# Patient Record
Sex: FEMALE | Race: BLACK OR AFRICAN AMERICAN | NOT HISPANIC OR LATINO | Employment: UNEMPLOYED | URBAN - METROPOLITAN AREA
[De-identification: names, ages, dates, MRNs, and addresses within clinical notes are randomized per-mention and may not be internally consistent; named-entity substitution may affect disease eponyms.]

---

## 2020-08-03 ENCOUNTER — HISTORICAL (OUTPATIENT)
Dept: RADIOLOGY | Facility: HOSPITAL | Age: 40
End: 2020-08-03

## 2022-11-23 ENCOUNTER — HOSPITAL ENCOUNTER (EMERGENCY)
Facility: HOSPITAL | Age: 42
Discharge: HOME | End: 2022-11-24
Attending: INTERNAL MEDICINE
Payer: MEDICAID

## 2022-11-23 DIAGNOSIS — F12.180 CANNABIS ABUSE WITH CANNABIS-INDUCED ANXIETY DISORDER: Primary | ICD-10-CM

## 2022-11-23 DIAGNOSIS — F41.9 ANXIETY: ICD-10-CM

## 2022-11-23 PROCEDURE — 93005 ELECTROCARDIOGRAM TRACING: CPT

## 2022-11-23 PROCEDURE — 93010 ELECTROCARDIOGRAM REPORT: CPT | Mod: ,,, | Performed by: INTERNAL MEDICINE

## 2022-11-23 PROCEDURE — 93010 EKG 12-LEAD: ICD-10-PCS | Mod: ,,, | Performed by: INTERNAL MEDICINE

## 2022-11-23 PROCEDURE — 99284 EMERGENCY DEPT VISIT MOD MDM: CPT | Mod: 25

## 2022-11-23 RX ORDER — PROCHLORPERAZINE EDISYLATE 5 MG/ML
10 INJECTION INTRAMUSCULAR; INTRAVENOUS ONCE
Status: COMPLETED | OUTPATIENT
Start: 2022-11-23 | End: 2022-11-24

## 2022-11-23 RX ORDER — MIDAZOLAM HYDROCHLORIDE 1 MG/ML
2 INJECTION INTRAMUSCULAR; INTRAVENOUS
Status: COMPLETED | OUTPATIENT
Start: 2022-11-23 | End: 2022-11-24

## 2022-11-24 VITALS
RESPIRATION RATE: 21 BRPM | HEART RATE: 117 BPM | SYSTOLIC BLOOD PRESSURE: 137 MMHG | BODY MASS INDEX: 29.44 KG/M2 | HEIGHT: 62 IN | OXYGEN SATURATION: 96 % | WEIGHT: 160 LBS | TEMPERATURE: 99 F | DIASTOLIC BLOOD PRESSURE: 82 MMHG

## 2022-11-24 PROCEDURE — 63600175 PHARM REV CODE 636 W HCPCS: Performed by: INTERNAL MEDICINE

## 2022-11-24 PROCEDURE — 96372 THER/PROPH/DIAG INJ SC/IM: CPT | Performed by: INTERNAL MEDICINE

## 2022-11-24 RX ADMIN — MIDAZOLAM 2 MG: 1 INJECTION INTRAMUSCULAR; INTRAVENOUS at 12:11

## 2022-11-24 RX ADMIN — PROCHLORPERAZINE EDISYLATE 10 MG: 5 INJECTION INTRAMUSCULAR; INTRAVENOUS at 12:11

## 2022-11-24 NOTE — ED PROVIDER NOTES
Source of History:  Patient, no limitations    Chief complaint:  Ingestion (REPORTS EATING THC EDIBLES TODAY THEN BECAME ANXIOUS PT SPEAKING IN SHORT BURST; REPORTS HER FIRST TIME TAKING MARIJUANA EDIBLES )      HPI:  Odette Patel is a 42 y.o. female presenting with Ingestion (REPORTS EATING THC EDIBLES TODAY THEN BECAME ANXIOUS PT SPEAKING IN SHORT BURST; REPORTS HER FIRST TIME TAKING MARIJUANA EDIBLES )         Anxiety: Patient complains of anxiety.  She has the following symptoms: paresthesias, psychomotor agitation, racing thoughts, shortness of breath. Onset of symptoms was approximately a few hours ago, unchanged since that time. She denies current suicidal and homicidal ideation.Possible organic causes contributing are:  Edibles for the 1st time today because it was her birthday .       Review of Systems   Constitutional symptoms:  Negative except as documented in HPI.   Skin symptoms:  Negative except as documented in HPI.   HEENT symptoms:  Negative except as documented in HPI.   Respiratory symptoms:  Negative except as documented in HPI.   Cardiovascular symptoms:  Negative except as documented in HPI.   Gastrointestinal symptoms:  Negative except as documented in HPI.    Genitourinary symptoms:  Negative except as documented in HPI.   Musculoskeletal symptoms:  Negative except as documented in HPI.   Neurologic symptoms:  Negative except as documented in HPI.   Psychiatric symptoms:  Negative except as documented in HPI.   Allergy/immunologic symptoms:  Negative except as documented in HPI.             Additional review of systems information: All other systems reviewed and otherwise negative.      Review of patient's allergies indicates:  Not on File    PMH:  As per HPI and below:    Past Medical History:   Diagnosis Date    Anxiety disorder, unspecified     Unspecified open wound of lower back and pelvis without penetration into retroperitoneum, initial encounter         No family history on  "file.    No past surgical history on file.         There is no problem list on file for this patient.       Physical Exam:    /87   Pulse (!) 120   Temp 98.5 °F (36.9 °C)   Resp 18   Ht 5' 2" (1.575 m)   Wt 72.6 kg (160 lb)   SpO2 95%   BMI 29.26 kg/m²     Nursing note and vital signs reviewed.    General:  Alert, anxious  Skin: Normal for Ethnic Origin, No cyanosis  HEENT: Normocephalic and atraumatic, Vision unchanged, Pupils symmetric, No icterus , Nasal mucosa is pink and moist  Cardiovascular:  Regular rate and rhythm, No edema  Chest Wall: No deformity, equal chest rise  Respiratory:  Lungs are clear to auscultation, respirations are non-labored.    Musculoskeletal:  No deformity, Normal perfusion to all extremities  Back: No bony tenderness  : No suprapubic pain, No CVA tenderness  Gastrointestinal:  Soft, Nontender, Non distended, Normal bowel sounds.    Neurological:  Alert and oriented to person, place, time, and situation, normal motor observed, normal speech observed.    Psychiatric:  Cooperative, anxious mood & affect.        Labs that have been ordered have been independently reviewed and interpreted by myself.     Old Chart Reviewed.      Initial Impression/ Differential Dx:  Decompensated psychiatric disease (schizophrenia, bipolar disorder, major depression), excited delirium, medication noncompliance, substance abuse/withdrawal, medication toxicity, acute stress reaction, personality disorder, malingering, metabolic derangement      MDM:      Reviewed Nurses Note.    Reviewed Pertinent old records.    Orders Placed This Encounter    Pulse Oximeter    Cardiac Monitoring - Adult    EKG 12-lead    prochlorperazine injection Soln 10 mg    midazolam (VERSED) 1 mg/mL injection 2 mg                    Labs Reviewed - No data to display       No orders to display        No visits with results within 1 Day(s) from this visit.   Latest known visit with results is:   No results found for any " previous visit.       Imaging Results    None           ECG Results              EKG 12-lead (Preliminary result)  Result time 11/23/22 23:51:49      ED Interpretation by Chris Banks DO (11/23/22 23:51:49, [facility] - Emergency Dept, Emergency Medicine)    Independent ECG Interpretation:    Sinus tachycardia at rate of 111. Normal intervals. Normal QRS. Nonspecific ST or T wave abnormalities. Overall impression: Abnormal                                                ED Course as of 11/24/22 0107   u Nov 24, 2022 0107 Patient improved with treatment in the emergency department and comfortable going home. Discussed reasons to return and importance of followup.  Patient understands that the emergency visit today is primarily to address immediate concerns and to rule out emergent cause of symptoms and that they may require further workup and evaluation as an outpatient. All questions addressed and patient given discharge instructions and followup information.    [MP]      ED Course User Index  [MP] Chris Banks DO                        Diagnostic Impression:    1. Cannabis abuse with cannabis-induced anxiety disorder    2. Anxiety         ED Disposition Condition    Discharge Stable             Follow-up Information       [facility] - Emergency Dept.    Specialty: Emergency Medicine  Why: If symptoms worsen  Contact information:  [address]  [facility] [phone]                            ED Prescriptions    None       Follow-up Information       Follow up With Specialties Details Why Contact Info    [facility] - Emergency Dept Emergency Medicine  If symptoms worsen [address]  [facility] [phone]             Chris Banks DO  11/24/22 0106       Chris Banks DO  11/24/22 0107

## 2023-06-30 ENCOUNTER — HOSPITAL ENCOUNTER (EMERGENCY)
Facility: HOSPITAL | Age: 43
Discharge: HOME | End: 2023-06-30
Attending: STUDENT IN AN ORGANIZED HEALTH CARE EDUCATION/TRAINING PROGRAM
Payer: MEDICAID

## 2023-06-30 VITALS
HEART RATE: 122 BPM | OXYGEN SATURATION: 97 % | DIASTOLIC BLOOD PRESSURE: 92 MMHG | SYSTOLIC BLOOD PRESSURE: 143 MMHG | RESPIRATION RATE: 20 BRPM | TEMPERATURE: 98 F

## 2023-06-30 DIAGNOSIS — S09.90XA CLOSED HEAD INJURY, INITIAL ENCOUNTER: Primary | ICD-10-CM

## 2023-06-30 DIAGNOSIS — S06.0X0A CONCUSSION WITHOUT LOSS OF CONSCIOUSNESS, INITIAL ENCOUNTER: ICD-10-CM

## 2023-06-30 PROCEDURE — 25000003 PHARM REV CODE 250: Performed by: STUDENT IN AN ORGANIZED HEALTH CARE EDUCATION/TRAINING PROGRAM

## 2023-06-30 PROCEDURE — 99284 EMERGENCY DEPT VISIT MOD MDM: CPT | Mod: 25

## 2023-06-30 RX ORDER — HYDROCODONE BITARTRATE AND ACETAMINOPHEN 5; 325 MG/1; MG/1
1 TABLET ORAL
Status: COMPLETED | OUTPATIENT
Start: 2023-06-30 | End: 2023-06-30

## 2023-06-30 RX ADMIN — HYDROCODONE BITARTRATE AND ACETAMINOPHEN 1 TABLET: 5; 325 TABLET ORAL at 03:06

## 2023-06-30 NOTE — ED PROVIDER NOTES
Encounter Date: 6/30/2023       History     Chief Complaint   Patient presents with    Fall     HPI    42-year-old female with a past medical history is emergency department for headache, nausea blurry vision.  Patient states that all this started after she hit her head over 24 hours ago.  States that the symptoms are worsening.  She is concerned that she has a head injury or brain bleed.  Patient very anxious.  States that she feels like her skull is soft.    Review of patient's allergies indicates:  No Known Allergies  Past Medical History:   Diagnosis Date    Anxiety disorder, unspecified     Unspecified open wound of lower back and pelvis without penetration into retroperitoneum, initial encounter      Past Surgical History:   Procedure Laterality Date    BACK SURGERY      CHOLECYSTECTOMY      TUBAL LIGATION       History reviewed. No pertinent family history.  Social History     Tobacco Use    Smoking status: Some Days     Types: Cigarettes    Smokeless tobacco: Never   Substance Use Topics    Alcohol use: Yes     Review of Systems   Constitutional:  Negative for fever.   Eyes:  Positive for visual disturbance.   Respiratory:  Negative for cough and shortness of breath.    Cardiovascular:  Negative for chest pain.   Gastrointestinal:  Positive for nausea. Negative for abdominal pain, constipation, diarrhea and vomiting.   Neurological:  Positive for dizziness and headaches.   All other systems reviewed and are negative.    Physical Exam     Initial Vitals   BP Pulse Resp Temp SpO2   06/30/23 0231 06/30/23 0234 06/30/23 0231 06/30/23 0231 06/30/23 0231   (!) 143/92 (!) 122 16 98.1 °F (36.7 °C) 97 %      MAP       --                Physical Exam    Nursing note and vitals reviewed.  Constitutional: She appears well-developed and well-nourished. No distress.   HENT:   Head: Normocephalic and atraumatic.   Cardiovascular:  Regular rhythm.   Tachycardia present.         Pulmonary/Chest: Breath sounds normal. No  respiratory distress.   Abdominal: Abdomen is soft. There is no abdominal tenderness.   Musculoskeletal:         General: No tenderness. Normal range of motion.     Neurological: She is alert and oriented to person, place, and time. She has normal strength. No cranial nerve deficit. GCS score is 15. GCS eye subscore is 4. GCS verbal subscore is 5. GCS motor subscore is 6.   Skin: Skin is warm. Capillary refill takes less than 2 seconds.   Psychiatric: Her behavior is normal. Thought content normal. Her mood appears anxious.       ED Course   Procedures  Labs Reviewed - No data to display       Imaging Results              CT Head Without Contrast (Preliminary result)  Result time 06/30/23 03:02:47      Preliminary result by Hi Restrepo Jr., MD (06/30/23 03:02:47)                   Narrative:    START OF REPORT:  TECHNIQUE: CT OF THE HEAD WAS PERFORMED WITH INTRAVENOUS CONTRAST WITH AXIAL AS WELL AS CORONAL AND SAGITTAL IMAGES.    COMPARISON: NONE.    DOSAGE INFORMATION: AUTOMATED EXPOSURE CONTROL WAS UTILIZED.    CLINICAL HISTORY: HEADACHE AFTER FALL.    Findings:  Hemorrhage: No acute intracranial hemorrhage is seen.  CSF spaces: The ventricles sulci and basal cisterns are within normal limits.  Brain parenchyma: Unremarkable with preservation of the grey white junction throughout.  Cerebellum: Unremarkable.  Sella and skull base: The sella appears to be within normal limits for age.  Intracranial calcifications: Incidental note is made of bilateral choroid plexus calcification. Incidental note is made of some pineal region calcification. Incidental note is made of some calcification of the falx.  Calvarium: No acute linear or depressed skull fracture is seen.    Maxillofacial Structures:  Orbits: The orbits appear unremarkable.  Temporal bones and mastoids: The temporal bones and mastoids appear unremarkable.  TMJ: The mandibular condyles appear normally placed with respect to the mandibular  fossa.      Impression:  1. No acute intracranial traumatic injury identified. Details as above.                                         Medications   HYDROcodone-acetaminophen 5-325 mg per tablet 1 tablet (1 tablet Oral Given 6/30/23 2794)     Medical Decision Making:   Differential Diagnosis:   Head injury, concussion, ICH, anxiety  ED Management:  Will obtain CT of the head of abundance of precaution.  She does meet CT criteria secondary to dangerous mechanism with supposed severe headache.            Medical Decision Making  Problems Addressed:  Closed head injury, initial encounter: self-limited or minor problem  Concussion without loss of consciousness, initial encounter: acute illness or injury    Amount and/or Complexity of Data Reviewed  Radiology: ordered and independent interpretation performed. Decision-making details documented in ED Course.    Risk  OTC drugs.  Prescription drug management.                  Clinical Impression:   Final diagnoses:  [S09.90XA] Closed head injury, initial encounter (Primary)  [S06.0X0A] Concussion without loss of consciousness, initial encounter        ED Disposition Condition    Discharge Stable          ED Prescriptions    None       Follow-up Information       Follow up With Specialties Details Why Contact Info    [facility] - Emergency Dept Emergency Medicine Go to  If symptoms worsen [address]  [facility] [phone]  [phone]             Jeremiah Donohue MD  06/30/23 8453

## 2023-06-30 NOTE — ED TRIAGE NOTES
Pt states she fell last night and hit her head on the door frame. Pt c/o HA, nausea, and blurred vision.

## 2024-01-28 ENCOUNTER — HOSPITAL ENCOUNTER (EMERGENCY)
Facility: HOSPITAL | Age: 44
Discharge: HOME | End: 2024-01-29
Attending: EMERGENCY MEDICINE
Payer: MEDICAID

## 2024-01-28 VITALS
TEMPERATURE: 99 F | DIASTOLIC BLOOD PRESSURE: 83 MMHG | WEIGHT: 170 LBS | HEART RATE: 107 BPM | RESPIRATION RATE: 20 BRPM | HEIGHT: 63 IN | SYSTOLIC BLOOD PRESSURE: 138 MMHG | OXYGEN SATURATION: 100 % | BODY MASS INDEX: 30.12 KG/M2

## 2024-01-28 DIAGNOSIS — R10.31 RIGHT LOWER QUADRANT ABDOMINAL PAIN: Primary | ICD-10-CM

## 2024-01-28 LAB
ALBUMIN SERPL-MCNC: 3.4 G/DL (ref 3.5–5)
ALBUMIN/GLOB SERPL: 0.9 RATIO (ref 1.1–2)
ALP SERPL-CCNC: 84 UNIT/L (ref 40–150)
ALT SERPL-CCNC: 15 UNIT/L (ref 0–55)
AMPHET UR QL SCN: NEGATIVE
APPEARANCE UR: CLEAR
AST SERPL-CCNC: 17 UNIT/L (ref 5–34)
BARBITURATE SCN PRESENT UR: NEGATIVE
BASOPHILS # BLD AUTO: 0.03 X10(3)/MCL
BASOPHILS NFR BLD AUTO: 0.3 %
BENZODIAZ UR QL SCN: POSITIVE
BILIRUB SERPL-MCNC: 0.2 MG/DL
BILIRUB UR QL STRIP.AUTO: NEGATIVE
BUN SERPL-MCNC: 7.4 MG/DL (ref 7–18.7)
CALCIUM SERPL-MCNC: 8.6 MG/DL (ref 8.4–10.2)
CANNABINOIDS UR QL SCN: NEGATIVE
CHLORIDE SERPL-SCNC: 104 MMOL/L (ref 98–107)
CO2 SERPL-SCNC: 24 MMOL/L (ref 22–29)
COCAINE UR QL SCN: NEGATIVE
COLOR UR AUTO: NORMAL
CREAT SERPL-MCNC: 0.82 MG/DL (ref 0.55–1.02)
EOSINOPHIL # BLD AUTO: 0.39 X10(3)/MCL (ref 0–0.9)
EOSINOPHIL NFR BLD AUTO: 3.6 %
ERYTHROCYTE [DISTWIDTH] IN BLOOD BY AUTOMATED COUNT: 14.3 % (ref 11.5–17)
FENTANYL UR QL SCN: NEGATIVE
GFR SERPLBLD CREATININE-BSD FMLA CKD-EPI: >60 MLS/MIN/1.73/M2
GLOBULIN SER-MCNC: 3.8 GM/DL (ref 2.4–3.5)
GLUCOSE SERPL-MCNC: 98 MG/DL (ref 74–100)
GLUCOSE UR QL STRIP.AUTO: NEGATIVE
HCT VFR BLD AUTO: 38.4 % (ref 37–47)
HGB BLD-MCNC: 12.5 G/DL (ref 12–16)
IMM GRANULOCYTES # BLD AUTO: 0.02 X10(3)/MCL (ref 0–0.04)
IMM GRANULOCYTES NFR BLD AUTO: 0.2 %
KETONES UR QL STRIP.AUTO: NEGATIVE
LACTATE SERPL-SCNC: 1.3 MMOL/L (ref 0.5–2.2)
LEUKOCYTE ESTERASE UR QL STRIP.AUTO: NEGATIVE
LIPASE SERPL-CCNC: 9 U/L
LYMPHOCYTES # BLD AUTO: 3.42 X10(3)/MCL (ref 0.6–4.6)
LYMPHOCYTES NFR BLD AUTO: 31.8 %
MCH RBC QN AUTO: 27.5 PG (ref 27–31)
MCHC RBC AUTO-ENTMCNC: 32.6 G/DL (ref 33–36)
MCV RBC AUTO: 84.6 FL (ref 80–94)
MDMA UR QL SCN: NEGATIVE
MONOCYTES # BLD AUTO: 0.84 X10(3)/MCL (ref 0.1–1.3)
MONOCYTES NFR BLD AUTO: 7.8 %
NEUTROPHILS # BLD AUTO: 6.06 X10(3)/MCL (ref 2.1–9.2)
NEUTROPHILS NFR BLD AUTO: 56.3 %
NITRITE UR QL STRIP.AUTO: NEGATIVE
NRBC BLD AUTO-RTO: 0 %
OPIATES UR QL SCN: POSITIVE
PCP UR QL: NEGATIVE
PH UR STRIP.AUTO: 6 [PH]
PH UR: 6 [PH] (ref 3–11)
PLATELET # BLD AUTO: 260 X10(3)/MCL (ref 130–400)
PMV BLD AUTO: 9.5 FL (ref 7.4–10.4)
POTASSIUM SERPL-SCNC: 3.4 MMOL/L (ref 3.5–5.1)
PROT SERPL-MCNC: 7.2 GM/DL (ref 6.4–8.3)
PROT UR QL STRIP.AUTO: NEGATIVE
RBC # BLD AUTO: 4.54 X10(6)/MCL (ref 4.2–5.4)
RBC UR QL AUTO: NEGATIVE
SODIUM SERPL-SCNC: 134 MMOL/L (ref 136–145)
SP GR UR STRIP.AUTO: 1.01 (ref 1–1.03)
SPECIFIC GRAVITY, URINE AUTO (.000) (OHS): 1.01 (ref 1–1.03)
UROBILINOGEN UR STRIP-ACNC: 1
WBC # SPEC AUTO: 10.76 X10(3)/MCL (ref 4.5–11.5)

## 2024-01-28 PROCEDURE — 63600175 PHARM REV CODE 636 W HCPCS: Mod: JZ,JG | Performed by: EMERGENCY MEDICINE

## 2024-01-28 PROCEDURE — 83690 ASSAY OF LIPASE: CPT | Performed by: EMERGENCY MEDICINE

## 2024-01-28 PROCEDURE — 25000003 PHARM REV CODE 250: Performed by: EMERGENCY MEDICINE

## 2024-01-28 PROCEDURE — 81003 URINALYSIS AUTO W/O SCOPE: CPT | Mod: 59 | Performed by: EMERGENCY MEDICINE

## 2024-01-28 PROCEDURE — 99285 EMERGENCY DEPT VISIT HI MDM: CPT | Mod: 25

## 2024-01-28 PROCEDURE — 85025 COMPLETE CBC W/AUTO DIFF WBC: CPT | Performed by: EMERGENCY MEDICINE

## 2024-01-28 PROCEDURE — 83605 ASSAY OF LACTIC ACID: CPT | Performed by: EMERGENCY MEDICINE

## 2024-01-28 PROCEDURE — 96374 THER/PROPH/DIAG INJ IV PUSH: CPT

## 2024-01-28 PROCEDURE — 80053 COMPREHEN METABOLIC PANEL: CPT | Performed by: EMERGENCY MEDICINE

## 2024-01-28 PROCEDURE — 87491 CHLMYD TRACH DNA AMP PROBE: CPT | Performed by: EMERGENCY MEDICINE

## 2024-01-28 PROCEDURE — 80307 DRUG TEST PRSMV CHEM ANLYZR: CPT | Performed by: EMERGENCY MEDICINE

## 2024-01-28 PROCEDURE — 96375 TX/PRO/DX INJ NEW DRUG ADDON: CPT

## 2024-01-28 RX ORDER — MORPHINE SULFATE 4 MG/ML
4 INJECTION, SOLUTION INTRAMUSCULAR; INTRAVENOUS
Status: COMPLETED | OUTPATIENT
Start: 2024-01-28 | End: 2024-01-28

## 2024-01-28 RX ORDER — SODIUM CHLORIDE 9 MG/ML
1000 INJECTION, SOLUTION INTRAVENOUS
Status: COMPLETED | OUTPATIENT
Start: 2024-01-28 | End: 2024-01-28

## 2024-01-28 RX ORDER — ONDANSETRON HYDROCHLORIDE 2 MG/ML
4 INJECTION, SOLUTION INTRAVENOUS
Status: COMPLETED | OUTPATIENT
Start: 2024-01-28 | End: 2024-01-28

## 2024-01-28 RX ADMIN — MORPHINE SULFATE 4 MG: 4 INJECTION, SOLUTION INTRAMUSCULAR; INTRAVENOUS at 10:01

## 2024-01-28 RX ADMIN — IOHEXOL 100 ML: 350 INJECTION, SOLUTION INTRAVENOUS at 11:01

## 2024-01-28 RX ADMIN — SODIUM CHLORIDE 1000 ML: 9 INJECTION, SOLUTION INTRAVENOUS at 10:01

## 2024-01-28 RX ADMIN — ONDANSETRON 4 MG: 2 INJECTION INTRAMUSCULAR; INTRAVENOUS at 10:01

## 2024-01-28 NOTE — Clinical Note
Ivonne Ashley accompanied their family member to the emergency department on 1/28/2024. They may return to school on 01/30/2024.      If you have any questions or concerns, please don't hesitate to call.      Miley WATTS

## 2024-01-28 NOTE — Clinical Note
"Odette Burr" Jorge was seen and treated in our emergency department on 1/28/2024.  She may return to work on 01/31/2024.       If you have any questions or concerns, please don't hesitate to call.      Sherlyn Mclean MD"

## 2024-01-29 LAB
C TRACH DNA SPEC QL NAA+PROBE: NOT DETECTED
N GONORRHOEA DNA SPEC QL NAA+PROBE: NOT DETECTED
SOURCE (OHS): NORMAL

## 2024-01-29 PROCEDURE — 25500020 PHARM REV CODE 255: Performed by: EMERGENCY MEDICINE

## 2024-01-29 RX ORDER — ONDANSETRON 4 MG/1
4 TABLET, ORALLY DISINTEGRATING ORAL EVERY 8 HOURS PRN
Qty: 12 TABLET | Refills: 0 | Status: SHIPPED | OUTPATIENT
Start: 2024-01-29

## 2024-01-29 RX ORDER — DICYCLOMINE HYDROCHLORIDE 20 MG/1
20 TABLET ORAL 2 TIMES DAILY
Qty: 20 TABLET | Refills: 0 | Status: SHIPPED | OUTPATIENT
Start: 2024-01-29 | End: 2024-02-28

## 2024-01-29 NOTE — ED PROVIDER NOTES
"Encounter Date: 1/28/2024       History     Chief Complaint   Patient presents with    Abdominal Pain     Pt has multiple complaints including, back pain, abdominal pain, dysuria, bladder spasms. Denies taking anything OTC for pain but took a norco 10mg this morning.     Back Pain     Patient is a 42 yo F presenting with R flank and RLQ pain. Symptoms started approx 3 days ago, moderate, now severe, described as "contractions". Never had this before. She's had dysuria. No fevers, no vomiting. No bowel changes. No rashes or leg swelling.       Review of patient's allergies indicates:  No Known Allergies  Past Medical History:   Diagnosis Date    Anxiety disorder, unspecified     Unspecified open wound of lower back and pelvis without penetration into retroperitoneum, initial encounter      Past Surgical History:   Procedure Laterality Date    BACK SURGERY      CHOLECYSTECTOMY      TUBAL LIGATION       History reviewed. No pertinent family history.  Social History     Tobacco Use    Smoking status: Some Days     Types: Cigarettes    Smokeless tobacco: Never   Substance Use Topics    Alcohol use: Yes     Review of Systems   Constitutional:  Negative for fever.   HENT:  Negative for sore throat.    Respiratory:  Negative for shortness of breath.    Cardiovascular:  Negative for chest pain.   Gastrointestinal:  Positive for abdominal pain and nausea. Negative for diarrhea and vomiting.   Genitourinary:  Positive for dysuria.   Musculoskeletal:  Positive for back pain.   Skin:  Negative for rash.   Neurological:  Negative for weakness.   Hematological:  Does not bruise/bleed easily.       Physical Exam     Initial Vitals [01/28/24 2129]   BP Pulse Resp Temp SpO2   138/83 107 18 98.5 °F (36.9 °C) 100 %      MAP       --         Physical Exam    Nursing note and vitals reviewed.  Constitutional: She appears well-developed and well-nourished. No distress.   HENT:   Head: Normocephalic and atraumatic.   Eyes: " Conjunctivae are normal.   Neck: Neck supple.   Cardiovascular:  Normal rate, regular rhythm and normal heart sounds.           No murmur heard.  Pulmonary/Chest: Breath sounds normal. No respiratory distress. She has no wheezes. She has no rhonchi.   Abdominal: Abdomen is soft. Bowel sounds are normal. She exhibits no distension. There is abdominal tenderness. There is guarding. There is no rebound.   Musculoskeletal:         General: No edema. Normal range of motion.      Cervical back: Neck supple.     Neurological: She is alert and oriented to person, place, and time.   Skin: Skin is warm and dry.   Psychiatric: She has a normal mood and affect. Thought content normal.       ED Course   Procedures  Labs Reviewed   DRUG SCREEN, URINE (BEAKER) - Abnormal; Notable for the following components:       Result Value    Benzodiazepine, Urine Positive (*)     Opiates, Urine Positive (*)     All other components within normal limits    Narrative:     Cut off concentrations:    Amphetamines - 1000 ng/ml  Barbiturates - 200 ng/ml  Benzodiazepine - 200 ng/ml  Cannabinoids (THC) - 50 ng/ml  Cocaine - 300 ng/ml  Fentanyl - 1.0 ng/ml  MDMA - 500 ng/ml  Opiates - 300 ng/ml   Phencyclidine (PCP) - 25 ng/ml    Specimen submitted for drug analysis and tested for pH and specific gravity in order to evaluate sample integrity. Suspect tampering if specific gravity is <1.003 and/or pH is not within the range of 4.5 - 8.0  False negatives may result form substances such as bleach added to urine.  False positives may result for the presence of a substance with similar chemical structure to the drug or its metabolite.    This test provides only a PRELIMINARY analytical test result. A more specific alternate chemical method must be used in order to obtain a confirmed analytical result. Gas chromatography/mass spectrometry (GC/MS) is the preferred confirmatory method. Other chemical confirmation methods are available. Clinical consideration  and professional judgement should be applied to any drug of abuse test result, particularly when preliminary positive results are used.    Positive results will be confirmed only at the physicians request. Unconfirmed screening results are to be used only for medical purposes (treatment).        COMPREHENSIVE METABOLIC PANEL - Abnormal; Notable for the following components:    Sodium Level 134 (*)     Potassium Level 3.4 (*)     Albumin Level 3.4 (*)     Globulin 3.8 (*)     Albumin/Globulin Ratio 0.9 (*)     All other components within normal limits   CBC WITH DIFFERENTIAL - Abnormal; Notable for the following components:    MCHC 32.6 (*)     All other components within normal limits   URINALYSIS, REFLEX TO URINE CULTURE - Normal   LIPASE - Normal   LACTIC ACID, PLASMA - Normal   CHLAMYDIA/GONORRHOEAE(GC), PCR   CBC W/ AUTO DIFFERENTIAL    Narrative:     The following orders were created for panel order CBC W/ AUTO DIFFERENTIAL.  Procedure                               Abnormality         Status                     ---------                               -----------         ------                     CBC with Differential[[id number]]        Abnormal            Final result                 Please view results for these tests on the individual orders.          Imaging Results              CT Abdomen Pelvis With IV Contrast NO Oral Contrast (Preliminary result)  Result time 01/29/24 00:44:22      Preliminary result by Rehan Gibbons MD (01/29/24 00:44:22)                   Narrative:    START OF REPORT:  Technique: CT of the abdomen and pelvis was performed with axial images as well as sagittal and coronal reconstruction images with intravenous contrast.    Comparison: Comparison is with CT chest, abdomen and pelvis study dated 2018-10-14 02:40:13.    Clinical History: Right quadrant and periumbilical pain.    Dosage Information: Automated Exposure Control was utilized.    Findings:  Thorax:  Lungs: There is mild  nonspecific dependent change at the lung bases.  Pleura: No effusions or thickening are seen.  Heart: The heart size is within normal limits.  Abdomen:  Abdominal Wall: No abdominal wall pathology is seen.  Liver: The liver appears unremarkable.  Biliary System: No intrahepatic or extrahepatic biliary duct dilatation is seen.  Gallbladder: Surgical clips are seen in the gallbladder fossa consistent with prior cholecystectomy.  Pancreas: The pancreas appears unremarkable.  Spleen: The spleen appears unremarkable.  Adrenals: The adrenal glands appear unremarkable.  Kidneys: The kidneys appear unremarkable with no stones cysts masses or hydronephrosis.  Aorta: The abdominal aorta appears unremarkable.  IVC: Unremarkable.  Bowel:  Esophagus: The visualized esophagus appears unremarkable.  Stomach: The stomach appears unremarkable.  Duodenum: Unremarkable appearing duodenum.  Small Bowel: The small bowel appears unremarkable.  Colon: Nondistended.  Appendix: The appendix appears unremarkable (series 3, images 47-56).  Peritoneum: No free intraperitoneal air is seen. Minimal free fluid is seen in the pelvis.    Pelvis:  Bladder: The bladder appears unremarkable.  Female:  Uterus: The uterus is surgically absent.  Ovaries: No adnexal masses are seen. There is interval resolution of previously noted left hydrosalpinx in the posterior cul-de-sac and the left adnexa.  Inguinal Findings:  Inguinal Hernia: Again noted are small inguinal hernias at the groin bilaterally.    Bony structures:  Dorsal Spine: There is mild spondylosis of the visualized dorsal spine. This is new since the prior examination.  Bony Pelvis: The visualized bony structures of the pelvis appear unremarkable.      Impression:  1. No acute intraabdominal or pelvic solid organ or bowel pathology identified. Details and other findings as discussed above.                                         Medications   ondansetron injection 4 mg (4 mg Intravenous Given  1/28/24 2202)   morphine injection 4 mg (4 mg Intravenous Given 1/28/24 2202)   0.9%  NaCl infusion (1,000 mLs Intravenous New Bag 1/28/24 2228)   iohexoL (OMNIPAQUE 350) injection 100 mL (100 mLs Intravenous Given 1/28/24 2314)     Medical Decision Making  Differentials considered but not limited to GERD, intestinal spasm, gastroenteritis, gastritis, ulcer, pancreatitis, ileus, small bowel obstruction, appendicitis, diverticulitis, colitis, constipation, intestinal gas pain, UTI, interstitial cystitis.     Problems Addressed:  Right lower quadrant abdominal pain: acute illness or injury    Amount and/or Complexity of Data Reviewed  Labs: ordered. Decision-making details documented in ED Course.  Radiology: ordered. Decision-making details documented in ED Course.    Risk  Prescription drug management.  Risk Details: Results were reviewed with patient. Patient resting comfortably in bed, pain controlled. Unclear cause of symptoms at this time but no emergent cause identified. Questions were answered along with a discussion of signs and symptoms to return for. Patient comfortable with plan of discharge. Patient's daughter was present at the time of discussion.                  ED Course as of 01/29/24 0412   Sun Jan 28, 2024   2255 CT and UA pending [GM]   Mon Jan 29, 2024   0053 Results were reviewed with patient. Questions were answered along with a discussion of signs and symptoms to return for. Patient comfortable with plan of discharge.   [GM]      ED Course User Index  [GM] Sherlyn Mclean MD                           Clinical Impression:  Final diagnoses:  [R10.31] Right lower quadrant abdominal pain (Primary)          ED Disposition Condition    Discharge           ED Prescriptions       Medication Sig Dispense Start Date End Date Auth. Provider    dicyclomine (BENTYL) 20 mg tablet Take 1 tablet (20 mg total) by mouth 2 (two) times daily. 20 tablet 1/29/2024 2/28/2024 Sherlyn Mclean MD    ondansetron  (ZOFRAN-ODT) 4 MG TbDL Take 1 tablet (4 mg total) by mouth every 8 (eight) hours as needed (Nausea/Vomiting). 12 tablet 1/29/2024 -- Sherlyn Mclean MD          Follow-up Information       Follow up With Specialties Details Why Contact Info    Please follow up with a primary care physician.  Call in 2 days If symptoms worsen, return to the ED If you do not have a doctor, please call [phone] to schedule your follow up with a local primary care doctor.             Sherlyn Mclean MD  01/29/24 9947

## 2024-03-11 ENCOUNTER — HOSPITAL ENCOUNTER (EMERGENCY)
Facility: HOSPITAL | Age: 44
Discharge: HOME | End: 2024-03-11
Attending: EMERGENCY MEDICINE
Payer: MEDICAID

## 2024-03-11 VITALS
DIASTOLIC BLOOD PRESSURE: 79 MMHG | HEART RATE: 82 BPM | OXYGEN SATURATION: 97 % | TEMPERATURE: 98 F | RESPIRATION RATE: 22 BRPM | SYSTOLIC BLOOD PRESSURE: 119 MMHG

## 2024-03-11 DIAGNOSIS — R00.2 PALPITATIONS: ICD-10-CM

## 2024-03-11 DIAGNOSIS — F41.9 ANXIETY: Primary | ICD-10-CM

## 2024-03-11 LAB
ALBUMIN SERPL-MCNC: 3.9 G/DL (ref 3.5–5)
ALBUMIN/GLOB SERPL: 1.1 RATIO (ref 1.1–2)
ALP SERPL-CCNC: 77 UNIT/L (ref 40–150)
ALT SERPL-CCNC: 17 UNIT/L (ref 0–55)
AMPHET UR QL SCN: NEGATIVE
APPEARANCE UR: CLEAR
AST SERPL-CCNC: 20 UNIT/L (ref 5–34)
BARBITURATE SCN PRESENT UR: NEGATIVE
BASOPHILS # BLD AUTO: 0.05 X10(3)/MCL
BASOPHILS NFR BLD AUTO: 0.4 %
BENZODIAZ UR QL SCN: NEGATIVE
BILIRUB SERPL-MCNC: 0.1 MG/DL
BILIRUB UR QL STRIP.AUTO: NEGATIVE
BUN SERPL-MCNC: 6.8 MG/DL (ref 7–18.7)
CALCIUM SERPL-MCNC: 8.9 MG/DL (ref 8.4–10.2)
CANNABINOIDS UR QL SCN: NEGATIVE
CHLORIDE SERPL-SCNC: 103 MMOL/L (ref 98–107)
CO2 SERPL-SCNC: 25 MMOL/L (ref 22–29)
COCAINE UR QL SCN: NEGATIVE
COLOR UR AUTO: NORMAL
CREAT SERPL-MCNC: 0.78 MG/DL (ref 0.55–1.02)
EOSINOPHIL # BLD AUTO: 0.17 X10(3)/MCL (ref 0–0.9)
EOSINOPHIL NFR BLD AUTO: 1.3 %
ERYTHROCYTE [DISTWIDTH] IN BLOOD BY AUTOMATED COUNT: 14.1 % (ref 11.5–17)
ETHANOL SERPL-MCNC: 37 MG/DL
FENTANYL UR QL SCN: NEGATIVE
GFR SERPLBLD CREATININE-BSD FMLA CKD-EPI: >60 MLS/MIN/1.73/M2
GLOBULIN SER-MCNC: 3.7 GM/DL (ref 2.4–3.5)
GLUCOSE SERPL-MCNC: 67 MG/DL (ref 74–100)
GLUCOSE UR QL STRIP.AUTO: NEGATIVE
HCT VFR BLD AUTO: 39.9 % (ref 37–47)
HGB BLD-MCNC: 13 G/DL (ref 12–16)
IMM GRANULOCYTES # BLD AUTO: 0.03 X10(3)/MCL (ref 0–0.04)
IMM GRANULOCYTES NFR BLD AUTO: 0.2 %
KETONES UR QL STRIP.AUTO: NEGATIVE
LEUKOCYTE ESTERASE UR QL STRIP.AUTO: NEGATIVE
LYMPHOCYTES # BLD AUTO: 4 X10(3)/MCL (ref 0.6–4.6)
LYMPHOCYTES NFR BLD AUTO: 31.5 %
MCH RBC QN AUTO: 28 PG (ref 27–31)
MCHC RBC AUTO-ENTMCNC: 32.6 G/DL (ref 33–36)
MCV RBC AUTO: 86 FL (ref 80–94)
MDMA UR QL SCN: NEGATIVE
MONOCYTES # BLD AUTO: 0.88 X10(3)/MCL (ref 0.1–1.3)
MONOCYTES NFR BLD AUTO: 6.9 %
NEUTROPHILS # BLD AUTO: 7.56 X10(3)/MCL (ref 2.1–9.2)
NEUTROPHILS NFR BLD AUTO: 59.7 %
NITRITE UR QL STRIP.AUTO: NEGATIVE
NRBC BLD AUTO-RTO: 0 %
OHS QRS DURATION: 84 MS
OHS QTC CALCULATION: 434 MS
OPIATES UR QL SCN: POSITIVE
PCP UR QL: NEGATIVE
PH UR STRIP.AUTO: 6.5 [PH]
PH UR: 6.5 [PH] (ref 3–11)
PLATELET # BLD AUTO: 235 X10(3)/MCL (ref 130–400)
PMV BLD AUTO: 9.4 FL (ref 7.4–10.4)
POTASSIUM SERPL-SCNC: 3.5 MMOL/L (ref 3.5–5.1)
PROT SERPL-MCNC: 7.6 GM/DL (ref 6.4–8.3)
PROT UR QL STRIP.AUTO: NEGATIVE
RBC # BLD AUTO: 4.64 X10(6)/MCL (ref 4.2–5.4)
RBC UR QL AUTO: NEGATIVE
SODIUM SERPL-SCNC: 138 MMOL/L (ref 136–145)
SP GR UR STRIP.AUTO: <=1.005 (ref 1–1.03)
SPECIFIC GRAVITY, URINE AUTO (.000) (OHS): <=1.005 (ref 1–1.03)
TROPONIN I SERPL-MCNC: <0.01 NG/ML (ref 0–0.04)
UROBILINOGEN UR STRIP-ACNC: 0.2
WBC # SPEC AUTO: 12.69 X10(3)/MCL (ref 4.5–11.5)

## 2024-03-11 PROCEDURE — 81003 URINALYSIS AUTO W/O SCOPE: CPT | Performed by: EMERGENCY MEDICINE

## 2024-03-11 PROCEDURE — 25000003 PHARM REV CODE 250: Performed by: EMERGENCY MEDICINE

## 2024-03-11 PROCEDURE — 84484 ASSAY OF TROPONIN QUANT: CPT | Performed by: EMERGENCY MEDICINE

## 2024-03-11 PROCEDURE — 80307 DRUG TEST PRSMV CHEM ANLYZR: CPT | Performed by: EMERGENCY MEDICINE

## 2024-03-11 PROCEDURE — 99284 EMERGENCY DEPT VISIT MOD MDM: CPT | Mod: 25

## 2024-03-11 PROCEDURE — 96374 THER/PROPH/DIAG INJ IV PUSH: CPT

## 2024-03-11 PROCEDURE — 80053 COMPREHEN METABOLIC PANEL: CPT | Performed by: EMERGENCY MEDICINE

## 2024-03-11 PROCEDURE — 63600175 PHARM REV CODE 636 W HCPCS: Performed by: EMERGENCY MEDICINE

## 2024-03-11 PROCEDURE — 93005 ELECTROCARDIOGRAM TRACING: CPT

## 2024-03-11 PROCEDURE — 82077 ASSAY SPEC XCP UR&BREATH IA: CPT | Performed by: EMERGENCY MEDICINE

## 2024-03-11 PROCEDURE — 85025 COMPLETE CBC W/AUTO DIFF WBC: CPT | Performed by: EMERGENCY MEDICINE

## 2024-03-11 PROCEDURE — 96361 HYDRATE IV INFUSION ADD-ON: CPT

## 2024-03-11 RX ORDER — ASPIRIN 325 MG
325 TABLET, DELAYED RELEASE (ENTERIC COATED) ORAL
Status: DISCONTINUED | OUTPATIENT
Start: 2024-03-11 | End: 2024-03-11 | Stop reason: HOSPADM

## 2024-03-11 RX ORDER — ALPRAZOLAM 1 MG/1
1 TABLET ORAL 2 TIMES DAILY
Qty: 30 TABLET | Refills: 0 | Status: SHIPPED | OUTPATIENT
Start: 2024-03-11

## 2024-03-11 RX ORDER — SODIUM CHLORIDE 9 MG/ML
1000 INJECTION, SOLUTION INTRAVENOUS
Status: COMPLETED | OUTPATIENT
Start: 2024-03-11 | End: 2024-03-11

## 2024-03-11 RX ORDER — LORAZEPAM 2 MG/ML
2 INJECTION INTRAMUSCULAR
Status: COMPLETED | OUTPATIENT
Start: 2024-03-11 | End: 2024-03-11

## 2024-03-11 RX ADMIN — SODIUM CHLORIDE 1000 ML: 9 INJECTION, SOLUTION INTRAVENOUS at 01:03

## 2024-03-11 RX ADMIN — LORAZEPAM 2 MG: 2 INJECTION INTRAMUSCULAR; INTRAVENOUS at 01:03

## 2024-03-11 NOTE — Clinical Note
Tricia Patel accompanied  their child to the emergency department on 3/11/2024. They may return to school on 03/12/2024.      If you have any questions or concerns, please don't hesitate to call.      Dianna Baker MD

## 2024-03-11 NOTE — Clinical Note
"Odette Burr" Jorge was seen and treated in our emergency department on 3/11/2024.  She may return to work on 03/14/2024.       If you have any questions or concerns, please don't hesitate to call.      Dianna Baker MD"

## 2024-03-11 NOTE — ED PROVIDER NOTES
Encounter Date: 3/11/2024       History     Chief Complaint   Patient presents with    Chest Pain     43-year-old female with a history of anxiety, hypertension, chronic back pain presents to the emergency room with a 1 hour history of anxiety, feeling like her heart is racing, and generalized weakness.  At presentation she was offered a wheelchair and said she felt fine to walk to the room, but on the way to the room, she says her legs got weak and she collapsed to the floor.  She states she did not hurt herself with the fall.  She was picked up by staff members and placed on a stretcher but then threw herself to the side and almost fell off the stretcher. She denies pain at this time.  She states she just feels shaky and weak and is tearful.  She takes Norco 10 mg prescribed 136 tablets last month.  She takes Xanax 1 mg b.i.d. but last prescription was January 28th 60 tablets, and so she has been out the last week.  She says she just did not make to her doctor to give her prescription for this.        Review of patient's allergies indicates:  No Known Allergies  Past Medical History:   Diagnosis Date    Anxiety disorder, unspecified     Hypertension     Unspecified open wound of lower back and pelvis without penetration into retroperitoneum, initial encounter      Past Surgical History:   Procedure Laterality Date    BACK SURGERY      CHOLECYSTECTOMY      TUBAL LIGATION       History reviewed. No pertinent family history.  Social History     Tobacco Use    Smoking status: Some Days     Types: Cigarettes    Smokeless tobacco: Never   Substance Use Topics    Alcohol use: Yes     Review of Systems   Cardiovascular:  Positive for palpitations.   Neurological:  Positive for weakness.   Psychiatric/Behavioral:  The patient is nervous/anxious.    All other systems reviewed and are negative.      Physical Exam     Initial Vitals [03/11/24 0101]   BP Pulse Resp Temp SpO2   119/79 82 (!) 22 97.7 °F (36.5 °C) 97 %      MAP        --         Physical Exam    Nursing note and vitals reviewed.  Constitutional: She appears well-developed and well-nourished. She is not diaphoretic. No distress.   HENT:   Head: Normocephalic and atraumatic.   Mouth/Throat: Oropharynx is clear and moist.   Eyes: Conjunctivae are normal. Pupils are equal, round, and reactive to light.   Neck: Neck supple.   Cardiovascular:  Normal rate, regular rhythm, normal heart sounds and intact distal pulses.           Pulmonary/Chest: Breath sounds normal. No respiratory distress. She has no wheezes. She has no rhonchi. She has no rales.   Abdominal: Abdomen is soft. Bowel sounds are normal. She exhibits no distension. There is no abdominal tenderness. There is no guarding.   Musculoskeletal:         General: No tenderness or edema. Normal range of motion.      Cervical back: Neck supple.     Neurological: She is alert and oriented to person, place, and time.   Skin: Skin is warm and dry. Capillary refill takes less than 2 seconds. No rash noted.   Psychiatric:   Anxious, tearful, tremulous, overwhelmed         ED Course   Procedures  Labs Reviewed   CBC WITH DIFFERENTIAL - Abnormal; Notable for the following components:       Result Value    WBC 12.69 (*)     MCHC 32.6 (*)     All other components within normal limits   COMPREHENSIVE METABOLIC PANEL - Abnormal; Notable for the following components:    Glucose Level 67 (*)     Blood Urea Nitrogen 6.8 (*)     Globulin 3.7 (*)     All other components within normal limits   ALCOHOL,MEDICAL (ETHANOL) - Abnormal; Notable for the following components:    Ethanol Level 37.0 (*)     All other components within normal limits   DRUG SCREEN, URINE (BEAKER) - Abnormal; Notable for the following components:    Opiates, Urine Positive (*)     All other components within normal limits    Narrative:     Cut off concentrations:    Amphetamines - 1000 ng/ml  Barbiturates - 200 ng/ml  Benzodiazepine - 200 ng/ml  Cannabinoids (THC) - 50  ng/ml  Cocaine - 300 ng/ml  Fentanyl - 1.0 ng/ml  MDMA - 500 ng/ml  Opiates - 300 ng/ml   Phencyclidine (PCP) - 25 ng/ml    Specimen submitted for drug analysis and tested for pH and specific gravity in order to evaluate sample integrity. Suspect tampering if specific gravity is <1.003 and/or pH is not within the range of 4.5 - 8.0  False negatives may result form substances such as bleach added to urine.  False positives may result for the presence of a substance with similar chemical structure to the drug or its metabolite.    This test provides only a PRELIMINARY analytical test result. A more specific alternate chemical method must be used in order to obtain a confirmed analytical result. Gas chromatography/mass spectrometry (GC/MS) is the preferred confirmatory method. Other chemical confirmation methods are available. Clinical consideration and professional judgement should be applied to any drug of abuse test result, particularly when preliminary positive results are used.    Positive results will be confirmed only at the physicians request. Unconfirmed screening results are to be used only for medical purposes (treatment).        URINALYSIS, REFLEX TO URINE CULTURE - Normal   TROPONIN I - Normal     EKG Readings: (Independently Interpreted)   Initial Reading: No STEMI. Rhythm: Normal Sinus Rhythm. Heart Rate: 83. ST Segments: Normal ST Segments. T Waves: Normal. Clinical Impression: Normal Sinus Rhythm       Imaging Results    None          Medications   aspirin EC tablet 325 mg (325 mg Oral Not Given 3/11/24 0110)   LORazepam injection 2 mg (2 mg Intravenous Given 3/11/24 0110)   0.9%  NaCl infusion (0 mLs Intravenous Stopped 3/11/24 0229)     Medical Decision Making  See HPI for narrative    Differential diagnosis includes but is not limited to anxiety, substance abuse, dehydration, psychiatric disorder, benzodiazepine withdrawal    Amount and/or Complexity of Data Reviewed  Labs: ordered. Decision-making  details documented in ED Course.  Discussion of management or test interpretation with external provider(s): Patient was seen and evaluated in the Emergency Department with history, physical exam, EKG, lab work, telemetry monitoring.  She was given Ativan 2 mg IV with resolution of her symptoms.  She did drink some alcohol to try to control her anxiety and understands not to drink alcohol.  Glucose was noted to be 67 and she states that she has not been eating much and this was discussed with her.  I will give her a 2 week supply of her Ativan until she can get back to see your doctor about his medication.  She was brought out by wheelchair by Med tech Fermín and was then seen to be able to walk without assistance to her car and her daughter brought her    Risk  Prescription drug management.               ED Course as of 03/11/24 0320   Mon Mar 11, 2024   0201 Glucose(!): 67  Brought her some juice for low blood sugar [SH]   0202 Alcohol, Serum(!): 37.0 [SH]   0222 Patient was given Ativan 2 mg IV and some IV fluids.  She drank some juice for her low blood sugar.  She says she is feeling much better at this time.  I will give her a prescription for 2 weeks' supply of her Xanax until she can see your doctor.  She is stable for discharge home, mentation clear, gait stable. [SH]      ED Course User Index  [SH] Dianna Baker MD                           Clinical Impression:  Final diagnoses:  [F41.9] Anxiety (Primary)  [R00.2] Palpitations          ED Disposition Condition    Discharge Stable          ED Prescriptions       Medication Sig Dispense Start Date End Date Auth. Provider    ALPRAZolam (XANAX) 1 MG tablet Take 1 tablet (1 mg total) by mouth 2 (two) times daily. 30 tablet 3/11/2024 -- Dianna Baker MD          Follow-up Information       Follow up With Specialties Details Why Contact Info    Your PCP  Schedule an appointment as soon as possible for a visit                Dianna Baker  MD  03/11/24 0323

## 2024-03-11 NOTE — Clinical Note
"Odette Burr" Jorge was seen and treated in our emergency department on 3/11/2024.  She may return to school on 03/12/2024.      If you have any questions or concerns, please don't hesitate to call.      Dianna Baker MD"

## 2024-03-11 NOTE — Clinical Note
Ivonne Ramirez accompanied  their child to the emergency department on 3/11/2024. They may return to school on 03/12/2024.      If you have any questions or concerns, please don't hesitate to call.      Dianna Baker MD

## 2024-05-07 DIAGNOSIS — R13.12 OROPHARYNGEAL DYSPHAGIA: Primary | ICD-10-CM

## 2024-05-24 ENCOUNTER — HOSPITAL ENCOUNTER (EMERGENCY)
Facility: HOSPITAL | Age: 44
Discharge: HOME | End: 2024-05-24
Attending: EMERGENCY MEDICINE
Payer: MEDICAID

## 2024-05-24 VITALS
WEIGHT: 170 LBS | DIASTOLIC BLOOD PRESSURE: 88 MMHG | SYSTOLIC BLOOD PRESSURE: 129 MMHG | BODY MASS INDEX: 31.28 KG/M2 | TEMPERATURE: 99 F | OXYGEN SATURATION: 97 % | HEART RATE: 89 BPM | RESPIRATION RATE: 20 BRPM | HEIGHT: 62 IN

## 2024-05-24 DIAGNOSIS — M79.673 HEEL PAIN: Primary | ICD-10-CM

## 2024-05-24 PROCEDURE — 99284 EMERGENCY DEPT VISIT MOD MDM: CPT | Mod: 25

## 2024-05-24 PROCEDURE — 96372 THER/PROPH/DIAG INJ SC/IM: CPT | Performed by: EMERGENCY MEDICINE

## 2024-05-24 PROCEDURE — 63600175 PHARM REV CODE 636 W HCPCS: Performed by: EMERGENCY MEDICINE

## 2024-05-24 RX ORDER — PROCHLORPERAZINE EDISYLATE 5 MG/ML
5 INJECTION INTRAMUSCULAR; INTRAVENOUS
Status: DISCONTINUED | OUTPATIENT
Start: 2024-05-24 | End: 2024-05-24

## 2024-05-24 RX ORDER — HYDROCODONE BITARTRATE AND ACETAMINOPHEN 5; 325 MG/1; MG/1
1 TABLET ORAL EVERY 6 HOURS PRN
Qty: 12 TABLET | Refills: 0 | Status: SHIPPED | OUTPATIENT
Start: 2024-05-24

## 2024-05-24 RX ORDER — DIPHENHYDRAMINE HYDROCHLORIDE 50 MG/ML
25 INJECTION INTRAMUSCULAR; INTRAVENOUS
Status: DISCONTINUED | OUTPATIENT
Start: 2024-05-24 | End: 2024-05-24

## 2024-05-24 RX ORDER — NAPROXEN 500 MG/1
500 TABLET ORAL 2 TIMES DAILY WITH MEALS
Qty: 60 TABLET | Refills: 0 | Status: SHIPPED | OUTPATIENT
Start: 2024-05-24

## 2024-05-24 RX ORDER — FAMOTIDINE 10 MG/ML
20 INJECTION INTRAVENOUS
Status: DISCONTINUED | OUTPATIENT
Start: 2024-05-24 | End: 2024-05-24

## 2024-05-24 RX ORDER — KETOROLAC TROMETHAMINE 30 MG/ML
60 INJECTION, SOLUTION INTRAMUSCULAR; INTRAVENOUS
Status: COMPLETED | OUTPATIENT
Start: 2024-05-24 | End: 2024-05-24

## 2024-05-24 RX ADMIN — KETOROLAC TROMETHAMINE 60 MG: 30 INJECTION, SOLUTION INTRAMUSCULAR at 01:05

## 2024-05-24 NOTE — DISCHARGE INSTRUCTIONS
Do not put any weight on your heel for the next week.  Use the crutches as instructed to get around.

## 2024-05-24 NOTE — ED PROVIDER NOTES
"Encounter Date: 5/24/2024       History     Chief Complaint   Patient presents with    Foot Pain     " Having problems with my heel for a month."     43-year-old female history of hypertension presents with left heel pain intermittently x1 month but worse tonight.  +swelling to heal and increased pain with palpation as well as ambulation.  Patient does not recall any injuries says she does stand on a hard floor throughout the day.  No long distance running or walking.  No pain in her other joints.  No night sweats, fever or chills    The history is provided by the patient.     Review of patient's allergies indicates:  No Known Allergies  Past Medical History:   Diagnosis Date    Anxiety disorder, unspecified     Hypertension     Unspecified open wound of lower back and pelvis without penetration into retroperitoneum, initial encounter      Past Surgical History:   Procedure Laterality Date    BACK SURGERY      CHOLECYSTECTOMY      TUBAL LIGATION       No family history on file.  Social History     Tobacco Use    Smoking status: Some Days     Types: Cigarettes    Smokeless tobacco: Never   Substance Use Topics    Alcohol use: Yes     Review of Systems   Constitutional:  Negative for chills, diaphoresis, fatigue and fever.   HENT:  Negative for congestion, drooling, ear discharge, ear pain, postnasal drip, rhinorrhea, sinus pressure, sinus pain, sore throat and tinnitus.    Eyes:  Negative for discharge.   Respiratory:  Negative for cough, chest tightness, shortness of breath and wheezing.    Cardiovascular:  Negative for chest pain and palpitations.   Gastrointestinal:  Negative for abdominal pain, diarrhea, nausea and vomiting.   Genitourinary:  Negative for frequency, hematuria and urgency.   Musculoskeletal:  Negative for back pain, neck pain and neck stiffness.   Skin:  Negative for rash.   Neurological:  Negative for syncope, weakness, light-headedness, numbness and headaches.   Psychiatric/Behavioral:  Negative " for suicidal ideas.        Physical Exam     Initial Vitals [05/24/24 0117]   BP Pulse Resp Temp SpO2   (!) 124/95 109 20 98.7 °F (37.1 °C) 97 %      MAP       --         Physical Exam    Nursing note and vitals reviewed.  Constitutional: She appears well-developed and well-nourished. She appears distressed.   Pulmonary/Chest: No respiratory distress.   Musculoskeletal:      Left foot: Normal range of motion and normal capillary refill. Swelling and tenderness present.      Comments: +tenderness to palpation of fat pad of left heel.  No increased pain with extension of toes.  No tenderness at insertion of plantar fascia     Skin: Skin is warm and dry. No rash noted. No erythema.         ED Course   Procedures  Labs Reviewed - No data to display       Imaging Results              X-Ray Foot Complete Left (In process)                      Medications   ketorolac injection 60 mg (60 mg Intramuscular Given 5/24/24 0135)     Medical Decision Making  Medical Decision Making  Problem list/ differential diagnosis including but not limited to:  Calcaneus fracture, contusion, heel fat pad syndrome, plantar fasciitis      Patient's chronic illnesses impacting care:  none      Diagnostic test considered but not ordered:      My interpretations:  Foot x-ray: No fracture    Radiology reports      Discussion of case with external qualified healthcare professionals:  Not applicable      Review of external notes( inpt, ems, NH, clinic):      Decision rules/scores:    Medications reviewed:  Medications ordered in the ER:  Toradol  Discharge prescriptions:  Naproxen, Norco    Social variables possible impacting patient's healthcare:    Code status/discussion    Shared decision making:    Consideration for admission versus discharge: stable for discharge     Amount and/or Complexity of Data Reviewed  Radiology: ordered.    Risk  Prescription drug management.                                      Clinical Impression:  Final  diagnoses:  [M79.673] Heel pain (Primary)          ED Disposition Condition    Discharge Stable          ED Prescriptions       Medication Sig Dispense Start Date End Date Auth. Provider    HYDROcodone-acetaminophen (NORCO) 5-325 mg per tablet Take 1 tablet by mouth every 6 (six) hours as needed. 12 tablet 5/24/2024 -- Rik Hudson MD    naproxen (NAPROSYN) 500 MG tablet Take 1 tablet (500 mg total) by mouth 2 (two) times daily with meals. 60 tablet 5/24/2024 -- Rik Hudson MD          Follow-up Information    None          Rik Hudson MD  05/24/24 9935

## 2024-10-09 ENCOUNTER — HOSPITAL ENCOUNTER (EMERGENCY)
Facility: HOSPITAL | Age: 44
Discharge: HOME | End: 2024-10-09
Attending: STUDENT IN AN ORGANIZED HEALTH CARE EDUCATION/TRAINING PROGRAM
Payer: MEDICAID

## 2024-10-09 VITALS
WEIGHT: 168 LBS | HEIGHT: 62 IN | TEMPERATURE: 98 F | OXYGEN SATURATION: 98 % | HEART RATE: 94 BPM | BODY MASS INDEX: 30.91 KG/M2 | SYSTOLIC BLOOD PRESSURE: 147 MMHG | DIASTOLIC BLOOD PRESSURE: 91 MMHG | RESPIRATION RATE: 20 BRPM

## 2024-10-09 DIAGNOSIS — N64.4 BREAST PAIN, RIGHT: Primary | ICD-10-CM

## 2024-10-09 PROCEDURE — 96372 THER/PROPH/DIAG INJ SC/IM: CPT | Performed by: NURSE PRACTITIONER

## 2024-10-09 PROCEDURE — 25000003 PHARM REV CODE 250: Performed by: NURSE PRACTITIONER

## 2024-10-09 PROCEDURE — 99284 EMERGENCY DEPT VISIT MOD MDM: CPT | Mod: 25

## 2024-10-09 PROCEDURE — 63600175 PHARM REV CODE 636 W HCPCS: Performed by: NURSE PRACTITIONER

## 2024-10-09 RX ORDER — MORPHINE SULFATE 4 MG/ML
4 INJECTION, SOLUTION INTRAMUSCULAR; INTRAVENOUS
Status: COMPLETED | OUTPATIENT
Start: 2024-10-09 | End: 2024-10-09

## 2024-10-09 RX ORDER — ONDANSETRON 4 MG/1
4 TABLET, ORALLY DISINTEGRATING ORAL
Status: COMPLETED | OUTPATIENT
Start: 2024-10-09 | End: 2024-10-09

## 2024-10-09 RX ORDER — KETOROLAC TROMETHAMINE 10 MG/1
10 TABLET, FILM COATED ORAL EVERY 8 HOURS PRN
Qty: 10 TABLET | Refills: 0 | Status: SHIPPED | OUTPATIENT
Start: 2024-10-09

## 2024-10-09 RX ORDER — KETOROLAC TROMETHAMINE 30 MG/ML
60 INJECTION, SOLUTION INTRAMUSCULAR; INTRAVENOUS
Status: COMPLETED | OUTPATIENT
Start: 2024-10-09 | End: 2024-10-09

## 2024-10-09 RX ORDER — CEPHALEXIN 500 MG/1
500 CAPSULE ORAL 4 TIMES DAILY
Qty: 28 CAPSULE | Refills: 0 | Status: SHIPPED | OUTPATIENT
Start: 2024-10-09 | End: 2024-10-16

## 2024-10-09 RX ADMIN — KETOROLAC TROMETHAMINE 60 MG: 30 INJECTION, SOLUTION INTRAMUSCULAR at 08:10

## 2024-10-09 RX ADMIN — ONDANSETRON 4 MG: 4 TABLET, ORALLY DISINTEGRATING ORAL at 09:10

## 2024-10-09 RX ADMIN — MORPHINE SULFATE 4 MG: 4 INJECTION INTRAVENOUS at 09:10

## 2024-10-10 NOTE — ED TRIAGE NOTES
Pt c/o pain to left breast. Symptoms x 2 days. Pt states pain in to upper left breast and underneath breast. Denies swelling and abnormal discharge.

## 2024-10-10 NOTE — ED PROVIDER NOTES
Encounter Date: 10/9/2024       History     Chief Complaint   Patient presents with    Breast Pain     Left breast     Patient states right breast pain x2 days.  Denies any redness, swelling, drainage, or nipple changes.  Patient denies any fever.  Patient states that pain is constant and worsens with palpation.  Patient denies any history of breast cancer.  History of hypertension, anxiety, hysterectomy.    The history is provided by the patient.     Review of patient's allergies indicates:  No Known Allergies  Past Medical History:   Diagnosis Date    Anxiety disorder, unspecified     Hypertension     Unspecified open wound of lower back and pelvis without penetration into retroperitoneum, initial encounter      Past Surgical History:   Procedure Laterality Date    BACK SURGERY      CHOLECYSTECTOMY      HYSTERECTOMY      TUBAL LIGATION       No family history on file.  Social History     Tobacco Use    Smoking status: Former     Types: Cigarettes    Smokeless tobacco: Never   Substance Use Topics    Alcohol use: Yes     Review of Systems   Constitutional: Negative.  Negative for chills and fever.   HENT: Negative.     Eyes: Negative.    Respiratory: Negative.     Cardiovascular: Negative.    Gastrointestinal: Negative.    Endocrine: Negative.    Genitourinary:         Breast pain.   Musculoskeletal: Negative.    Skin: Negative.  Negative for rash.   Allergic/Immunologic: Negative.    Neurological: Negative.    Hematological: Negative.    Psychiatric/Behavioral: Negative.     All other systems reviewed and are negative.      Physical Exam     Initial Vitals [10/09/24 2023]   BP Pulse Resp Temp SpO2   (!) 167/109 94 16 98 °F (36.7 °C) 98 %      MAP       --         Physical Exam    Nursing note and vitals reviewed.  Constitutional: She appears well-developed and well-nourished. No distress.   HENT:   Head: Normocephalic and atraumatic. Mouth/Throat: Oropharynx is clear and moist.   Eyes: Conjunctivae and EOM are  normal. Pupils are equal, round, and reactive to light.   Neck: Neck supple.   Normal range of motion.  Cardiovascular:  Normal rate, regular rhythm, normal heart sounds and intact distal pulses.           Pulmonary/Chest: Effort normal and breath sounds normal. No respiratory distress. She has no wheezes.   Right breast exhibits tenderness (There is tenderness to palpation to the upper outer breast.  There is no erythema, lumps, fluctuance, or abscess noted.). Right breast exhibits no inverted nipple, no mass, no nipple discharge and no skin change. Left breast exhibits no inverted nipple, no mass, no nipple discharge, no skin change and no tenderness. No breast swelling. Breasts are symmetrical.   Abdominal: Abdomen is soft. She exhibits no distension. There is no abdominal tenderness.   Genitourinary: No breast swelling.   Musculoskeletal:         General: No tenderness or edema. Normal range of motion.      Cervical back: Normal range of motion and neck supple.     Neurological: She is alert and oriented to person, place, and time. She has normal strength. Gait normal. GCS score is 15. GCS eye subscore is 4. GCS verbal subscore is 5. GCS motor subscore is 6.   Skin: Skin is warm and dry. No rash noted.   Psychiatric: She has a normal mood and affect. Thought content normal.         ED Course   Procedures  Labs Reviewed - No data to display       Imaging Results    None          Medications   ondansetron disintegrating tablet 4 mg (has no administration in time range)   morphine injection 4 mg (has no administration in time range)   ketorolac injection 60 mg (60 mg Intramuscular Given 10/9/24 2045)     Medical Decision Making  Patient states right breast pain x2 days.  Denies any redness, swelling, drainage, or nipple changes.  Patient denies any fever.  Patient states that pain is constant and worsens with palpation.  Patient denies any history of breast cancer.  History of hypertension, anxiety,  hysterectomy.    The history is provided by the patient.       Amount and/or Complexity of Data Reviewed  Discussion of management or test interpretation with external provider(s): Differential diagnosis (including but not limited to):   Judging by the patient's chief complaint and pertinent history, the patient has the following possible differential diagnoses, including but not limited to the following.  Some of these are deemed to be lower likelihood and some more likely based on my physical exam and history combined with possible lab work and/or imaging studies.   Please see the pertinent studies, and refer to the HPI.  Some of these diagnoses will take further evaluation to fully rule out, perhaps as an outpatient and the patient was encouraged to follow up when discharged for more comprehensive evaluation.  Breast pain, mastitis, abscess, cellulitis, breast cancer  Discussed with patient that we will treat her right breast pain with anti-inflammatories and will send a referral to the breast clinic at [facility] for a follow-up so that she can obtain a mammogram and evaluation.  On exam right breast is without any skin changes, nipple changes, no lumps are felt, no swelling is noted, and breasts are symmetrical.  There is no abscess or fluctuance noted on exam as well.  There is tenderness to palpation to the right upper outer quadrant of right breast.  Patient was instructed to follow up with her primary care provider.  ED return precautions given.    Risk  Prescription drug management.                                      Clinical Impression:  Final diagnoses:  [N64.4] Breast pain, right (Primary)          ED Disposition Condition    Discharge Stable          ED Prescriptions       Medication Sig Dispense Start Date End Date Auth. Provider    cephALEXin (KEFLEX) 500 MG capsule Take 1 capsule (500 mg total) by mouth 4 (four) times daily. for 7 days 28 capsule 10/9/2024 10/16/2024 Elizabeth Gomes, JEOVANNYP    ketorolac  (TORADOL) 10 mg tablet Take 1 tablet (10 mg total) by mouth every 8 (eight) hours as needed for Pain. 10 tablet 10/9/2024 -- Elizabeth Gomes FNP          Follow-up Information       Follow up With Specialties Details Why Contact Info    Primary Care Provider  In 3 days      [facility] will call you with a follow-up appointment.                 Elizabeth Gomes FNP  10/09/24 1889

## 2024-12-06 ENCOUNTER — HOSPITAL ENCOUNTER (EMERGENCY)
Facility: HOSPITAL | Age: 44
Discharge: HOME | End: 2024-12-06
Attending: EMERGENCY MEDICINE
Payer: MEDICAID

## 2024-12-06 VITALS
HEIGHT: 62 IN | DIASTOLIC BLOOD PRESSURE: 88 MMHG | SYSTOLIC BLOOD PRESSURE: 144 MMHG | TEMPERATURE: 98 F | BODY MASS INDEX: 31.28 KG/M2 | RESPIRATION RATE: 18 BRPM | WEIGHT: 170 LBS | OXYGEN SATURATION: 99 % | HEART RATE: 97 BPM

## 2024-12-06 DIAGNOSIS — J01.80 OTHER SUBACUTE SINUSITIS: ICD-10-CM

## 2024-12-06 DIAGNOSIS — R51.9 ACUTE NONINTRACTABLE HEADACHE, UNSPECIFIED HEADACHE TYPE: Primary | ICD-10-CM

## 2024-12-06 PROCEDURE — 96372 THER/PROPH/DIAG INJ SC/IM: CPT

## 2024-12-06 PROCEDURE — 63600175 PHARM REV CODE 636 W HCPCS

## 2024-12-06 PROCEDURE — 25000003 PHARM REV CODE 250

## 2024-12-06 PROCEDURE — 99284 EMERGENCY DEPT VISIT MOD MDM: CPT | Mod: 25

## 2024-12-06 RX ORDER — BUTALBITAL, ACETAMINOPHEN AND CAFFEINE 50; 325; 40 MG/1; MG/1; MG/1
1 TABLET ORAL
Status: COMPLETED | OUTPATIENT
Start: 2024-12-06 | End: 2024-12-06

## 2024-12-06 RX ORDER — DIPHENHYDRAMINE HCL 25 MG
25 CAPSULE ORAL
Status: COMPLETED | OUTPATIENT
Start: 2024-12-06 | End: 2024-12-06

## 2024-12-06 RX ORDER — DEXAMETHASONE SODIUM PHOSPHATE 4 MG/ML
8 INJECTION, SOLUTION INTRA-ARTICULAR; INTRALESIONAL; INTRAMUSCULAR; INTRAVENOUS; SOFT TISSUE
Status: COMPLETED | OUTPATIENT
Start: 2024-12-06 | End: 2024-12-06

## 2024-12-06 RX ORDER — KETOROLAC TROMETHAMINE 30 MG/ML
30 INJECTION, SOLUTION INTRAMUSCULAR; INTRAVENOUS
Status: COMPLETED | OUTPATIENT
Start: 2024-12-06 | End: 2024-12-06

## 2024-12-06 RX ORDER — AMOXICILLIN AND CLAVULANATE POTASSIUM 875; 125 MG/1; MG/1
1 TABLET, FILM COATED ORAL EVERY 12 HOURS
Qty: 14 TABLET | Refills: 0 | Status: SHIPPED | OUTPATIENT
Start: 2024-12-06 | End: 2024-12-13

## 2024-12-06 RX ORDER — KETOROLAC TROMETHAMINE 10 MG/1
10 TABLET, FILM COATED ORAL EVERY 6 HOURS PRN
Qty: 20 TABLET | Refills: 0 | Status: SHIPPED | OUTPATIENT
Start: 2024-12-06 | End: 2024-12-11

## 2024-12-06 RX ORDER — PROCHLORPERAZINE MALEATE 10 MG
10 TABLET ORAL
Status: COMPLETED | OUTPATIENT
Start: 2024-12-06 | End: 2024-12-06

## 2024-12-06 RX ADMIN — PROCHLORPERAZINE MALEATE 10 MG: 10 TABLET ORAL at 02:12

## 2024-12-06 RX ADMIN — KETOROLAC TROMETHAMINE 30 MG: 30 INJECTION, SOLUTION INTRAMUSCULAR at 02:12

## 2024-12-06 RX ADMIN — DEXAMETHASONE SODIUM PHOSPHATE 8 MG: 4 INJECTION, SOLUTION INTRA-ARTICULAR; INTRALESIONAL; INTRAMUSCULAR; INTRAVENOUS; SOFT TISSUE at 04:12

## 2024-12-06 RX ADMIN — BUTALBITAL, ACETAMINOPHEN, AND CAFFEINE 1 TABLET: 325; 50; 40 TABLET ORAL at 04:12

## 2024-12-06 RX ADMIN — DIPHENHYDRAMINE HYDROCHLORIDE 25 MG: 25 CAPSULE ORAL at 02:12

## 2024-12-06 NOTE — ED PROVIDER NOTES
"Encounter Date: 12/6/2024       History     Chief Complaint   Patient presents with    Headache     Headache X 1 month,  "knots in neck     44 y.o.  female with a history of Hypertension and Anxiety presents to Emergency Department with a chief complaint of headache. Symptoms began 1 month ago and have been recurrent since onset.. Associated symptoms include nausea, blurred vision, and "knots on her head". Symptoms are aggravated with exertion and there are no alleviating factors. Patient has not seen her PCP regarding symptoms. The patient denies CP, SOB, dizziness, injury/trauma, vomiting, syncope, or confusion. She reports taking NSAIDs and Tylenol without relief of symptoms. No other reported symptoms at this time      The history is provided by the patient. No  was used.   Headache   This is a recurrent problem. The current episode started 1 to 4 weeks ago. The problem occurs daily. The problem has been unchanged. Associated symptoms include blurred vision and nausea. Pertinent negatives include no abdominal pain, coughing, dizziness, fever, numbness, photophobia or vomiting. She has tried NSAIDs and acetaminophen for the symptoms. The treatment provided no relief. Her past medical history is significant for hypertension.     Review of patient's allergies indicates:  No Known Allergies  Past Medical History:   Diagnosis Date    Anxiety disorder, unspecified     Hypertension     Unspecified open wound of lower back and pelvis without penetration into retroperitoneum, initial encounter      Past Surgical History:   Procedure Laterality Date    BACK SURGERY      CHOLECYSTECTOMY      HYSTERECTOMY      TUBAL LIGATION       No family history on file.  Social History     Tobacco Use    Smoking status: Former     Types: Vaping with nicotine    Smokeless tobacco: Never   Substance Use Topics    Alcohol use: Yes     Comment: drinks socially    Drug use: Never     Review of Systems "   Constitutional:  Negative for diaphoresis, fatigue and fever.   Eyes:  Positive for blurred vision. Negative for photophobia.   Respiratory:  Negative for cough, shortness of breath, wheezing and stridor.    Cardiovascular:  Negative for chest pain and leg swelling.   Gastrointestinal:  Positive for nausea. Negative for abdominal pain and vomiting.   Neurological:  Positive for headaches. Negative for dizziness, syncope, facial asymmetry and numbness.   All other systems reviewed and are negative.      Physical Exam     Initial Vitals [12/06/24 1423]   BP Pulse Resp Temp SpO2   (!) 164/138 105 20 97.9 °F (36.6 °C) 98 %      MAP       --         Physical Exam    Nursing note and vitals reviewed.  Constitutional: She appears well-developed and well-nourished. She is not diaphoretic. She is cooperative.  Non-toxic appearance. No distress.   HENT:   Head: Normocephalic and atraumatic. Head is without abrasion, without contusion, without right periorbital erythema and without left periorbital erythema. Hair is normal.       Right Ear: External ear normal.   Left Ear: External ear normal.   Nose: Nose normal.   Tenderness noted to outlined area.    Eyes: Conjunctivae and EOM are normal. Pupils are equal, round, and reactive to light. Right eye exhibits no discharge. Left eye exhibits no discharge.   Neck: Neck supple.       Small area of swelling and tenderness noted. No redness, discoloration, or wounds noted.    Normal range of motion.  Cardiovascular:  Normal rate, regular rhythm, S1 normal, S2 normal, normal heart sounds, intact distal pulses and normal pulses.           Pulmonary/Chest: Effort normal and breath sounds normal. No tachypnea and no bradypnea. No respiratory distress. She has no decreased breath sounds. She has no wheezes. She has no rhonchi. She has no rales.   Musculoskeletal:         General: Normal range of motion.      Cervical back: Normal range of motion and neck supple. No edema, erythema or  rigidity. No spinous process tenderness. Normal range of motion.     Neurological: She is alert and oriented to person, place, and time. She has normal strength. No sensory deficit. She displays a negative Romberg sign. GCS score is 15. GCS eye subscore is 4. GCS verbal subscore is 5. GCS motor subscore is 6.   Mental status   Alert and attentive   Speech clear and fluent with normal comprehension   Able to provide clear account of historical and recent events   Oriented to person place and time   Able to follow 3 commands sequence    Cranial nerves   2. Pupils equal round reactive to light bilaterally, right eye:  No visual defects, 4 quadrant my right finger confirmation.  Left eye:  No visual defects, 4 quadrant by finger confirmation   3. Bilateral eye adduction and elevation without diplopia    4. Bilateral eye adduction and depression without diplopia   6. Bilateral eye abduction with diplopia  5. Masseter muscle normal bulk and jaw opens symmetrically   7. Face symmetric during speech.  Wrinkle forehead and smile symmetrically intact, bilaterally   8.  Response to verbal stimulus   9. Normal speech without breathlessness or hoarseness   10. Normal speech without breathlessness or hoarseness  12. Tongue protrudes midline    Motor exam   Upper extremities: No pronator drift   Lower extremities:  Bilateral leg lift off bed to resistance, symmetric 5/5   Coordination:  No nystagmus, no saccadic pursuit on eye range of motion    Gait:  Normal gait  Sensation: Deferred     Skin: Skin is warm and dry. Capillary refill takes less than 2 seconds.   Psychiatric: She has a normal mood and affect. Thought content normal.         ED Course   Procedures  Labs Reviewed - No data to display       Imaging Results              CT Head Without Contrast (Final result)  Result time 12/06/24 15:45:11      Final result by Drew Irene MD (12/06/24 15:45:11)                   Impression:      Ethmoid and sphenoid  "sinusitis otherwise unremarkable      Electronically signed by: Tino Irene  Date:    12/06/2024  Time:    15:45               Narrative:    EXAMINATION:  CT HEAD WITHOUT CONTRAST    CLINICAL HISTORY:  Headache, chronic, new features or increased frequency;    TECHNIQUE:  Multiple axial images were obtained from the base of the brain to the vertex without contrast administration.  Sagittal and coronal reconstructions were performed. .Automatic exposure control  (AEC) is utilized to reduce patient radiation exposure.    COMPARISON:  06/30/2023    FINDINGS:  There is no intracranial mass or lesion seen.  No hemorrhage is seen.  No infarct is seen.  The ventricles and basilar cisterns appear normal.  Brain parenchyma appears grossly unremarkable.    Posterior fossa appears normal.  The calvarium is intact.  There is mucosal thickening in the ethmoid sinus as well as the sphenoid sinus                                       Medications   ketorolac injection 30 mg (30 mg Intramuscular Given 12/6/24 1457)   prochlorperazine tablet 10 mg (10 mg Oral Given 12/6/24 1457)   diphenhydrAMINE capsule 25 mg (25 mg Oral Given 12/6/24 1457)   dexAMETHasone injection 8 mg (8 mg Intramuscular Given 12/6/24 1612)   butalbital-acetaminophen-caffeine -40 mg per tablet 1 tablet (1 tablet Oral Given 12/6/24 1611)     Medical Decision Making  Patient awake, alert, has non-labored breathing, and follows commands appropriately. Arrived to ED due to headache that has been recurrent for 1 month. Also c/o burred vision, nausea, and "knots" to neck. Afebrile. Denies injury/trauma. NAD Noted.     Judging by the patient's chief complaint and pertinent history, the patient has the following possible differential diagnoses, including but not limited to the following: Headache, Migraine, Hypertension, Pain    Some of these are deemed to be lower likelihood and some more likely based on my physical exam and history combined with possible lab " "work and/or imaging studies. Please see the pertinent studies, and refer to the HPI. Some of these diagnoses will take further evaluation to fully rule out, perhaps as an outpatient and the patient was encouraged to follow up when discharged for more comprehensive evaluation.       Amount and/or Complexity of Data Reviewed  Radiology: ordered. Decision-making details documented in ED Course.     Details: Informed patient of results.   Discussion of management or test interpretation with external provider(s): Since patient has been having headache for 1 month and imaging revealed sinusitis, will treat with Augmentin. Patient's "knot" to the back of her head could represent cyst, to f/u with PCP regarding skin lesion. No signs of infection noted. I & D not indicated. Discussed plan of care and interventions with patient. Agreed to and aware of plan of care. Comfortable being discharged home. Patient discharged home. Patient denies new or additional complaints; no further tests indicated at this time. Verbalized understanding of instructions. No emergent or apparent distress noted prior to discharge.. Strict ER return precautions given.       Risk  OTC drugs.  Prescription drug management.               ED Course as of 12/06/24 1710   Fri Dec 06, 2024   1549 CT Head Without Contrast  There is no intracranial mass or lesion seen.  No hemorrhage is seen.  No infarct is seen.  The ventricles and basilar cisterns appear normal.  Brain parenchyma appears grossly unremarkable. Posterior fossa appears normal.  The calvarium is intact.  There is mucosal thickening in the ethmoid sinus as well as the sphenoid sinus   [JA]   1650 Patient reports headache has resolved after med admin. Discussed results with patient. Since headache has been persistent for several weeks, will refer to neurology for outpatient evaluation. At disposition, patient has no additional complaints, symptoms improved, neurologically intact, has no stroke " like symptoms, no neuro deficits noted, and non-toxic in appearance. Stable for discharge home.  [JA]      ED Course User Index  [JA] Neetu Victor NP                           Clinical Impression:  Final diagnoses:  [R51.9] Acute nonintractable headache, unspecified headache type (Primary)  [J01.80] Other subacute sinusitis          ED Disposition Condition    Discharge Stable          ED Prescriptions       Medication Sig Dispense Start Date End Date Auth. Provider    ketorolac (TORADOL) 10 mg tablet Take 1 tablet (10 mg total) by mouth every 6 (six) hours as needed for Pain. 20 tablet 12/6/2024 12/11/2024 Neetu Victor NP    amoxicillin-clavulanate 875-125mg (AUGMENTIN) 875-125 mg per tablet Take 1 tablet by mouth every 12 (twelve) hours. for 7 days 14 tablet 12/6/2024 12/13/2024 Neetu Victor NP          Follow-up Information       Follow up With Specialties Details Why Contact Info Additional Information    PCP  Call on 12/9/2024 to schedule an appointment.      [facility] - Emergency Dept Emergency Medicine Go to  If symptoms worsen, As needed [address] [facility] [id number] [phone]     [facility] Neurology Call on 12/9/2024 to schedule an appointment. [address] [facility] [id number] [phone] Entrance 1             Neetu Victor NP  12/06/24 [id number]

## 2024-12-06 NOTE — DISCHARGE INSTRUCTIONS
Thanks for letting us take care of you today!  It is our goal to give you courteous care and to keep you comfortable and informed, if you have any questions before you leave I will be happy to try and answer them.    Here is some advice after your visit:      Your visit in the emergency department is NOT definitive care - please follow-up with your primary care doctor and/or specialist within 1 week.  Please return if you have any worsening in your condition or if you have any other concerns.    If you had radiology exams like an XRAY or CT in the emergency Department the interpreation on them may be preliminary - there may be less time sensitive findings on the reports please obtain these reports within 24 hours from the hospital or by using your out on your mobile phone to access records.  Bring these to your primary care doctor and/or specialist for further review of incidental findings.    You have been prescribed Toradol for pain. This is an Non-Steroidal Anti-Inflammatory (NSAID) Medication. Please do not take any additional NSAIDs while you are taking this medication including (Advil, Aleve, Motrin, Ibuprofen, Mobic\meloxicam, Naprosyn, Toradol, etc.). Please stop taking this medication if you experience: weakness, itching, yellow skin or eyes, joint pains, vomiting blood, blood or black stools, unusual weight gain, or swelling in your arms, legs, hands, or feet.     While in the Emergency Department you received medication that may cause drowsiness, dizziness, impaired judgment, and reduced physical capabilities. You should not drive, operate heavy machinery, swim, or make life  changing decisions within 24 hours of receiving this medication.

## 2024-12-08 ENCOUNTER — HOSPITAL ENCOUNTER (EMERGENCY)
Facility: HOSPITAL | Age: 44
Discharge: HOME | End: 2024-12-08
Attending: EMERGENCY MEDICINE
Payer: MEDICAID

## 2024-12-08 VITALS
DIASTOLIC BLOOD PRESSURE: 101 MMHG | RESPIRATION RATE: 20 BRPM | HEART RATE: 101 BPM | SYSTOLIC BLOOD PRESSURE: 140 MMHG | TEMPERATURE: 99 F | HEIGHT: 62 IN | OXYGEN SATURATION: 97 % | BODY MASS INDEX: 31.28 KG/M2 | WEIGHT: 170 LBS

## 2024-12-08 DIAGNOSIS — S16.1XXA CERVICAL STRAIN, ACUTE, INITIAL ENCOUNTER: Primary | ICD-10-CM

## 2024-12-08 DIAGNOSIS — S00.03XA SCALP HEMATOMA, INITIAL ENCOUNTER: ICD-10-CM
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
## 2024-12-08 PROCEDURE — 25000003 PHARM REV CODE 250: Performed by: EMERGENCY MEDICINE

## 2024-12-08 PROCEDURE — 99284 EMERGENCY DEPT VISIT MOD MDM: CPT | Mod: 25

## 2024-12-08 RX ORDER — IBUPROFEN 800 MG/1
800 TABLET ORAL 3 TIMES DAILY
Qty: 30 TABLET | Refills: 0 | Status: SHIPPED | OUTPATIENT
Start: 2024-12-08 | End: 2024-12-18

## 2024-12-08 RX ORDER — CYCLOBENZAPRINE HCL 10 MG
10 TABLET ORAL 3 TIMES DAILY PRN
Qty: 15 TABLET | Refills: 0 | Status: SHIPPED | OUTPATIENT
Start: 2024-12-08 | End: 2024-12-13

## 2024-12-08 RX ORDER — CYCLOBENZAPRINE HCL 10 MG
10 TABLET ORAL
Status: COMPLETED | OUTPATIENT
Start: 2024-12-08 | End: 2024-12-08

## 2024-12-08 RX ORDER — HYDROCODONE BITARTRATE AND ACETAMINOPHEN 7.5; 325 MG/1; MG/1
1 TABLET ORAL EVERY 6 HOURS PRN
Qty: 20 TABLET | Refills: 0 | Status: SHIPPED | OUTPATIENT
Start: 2024-12-08 | End: 2024-12-13

## 2024-12-08 RX ORDER — HYDROCODONE BITARTRATE AND ACETAMINOPHEN 10; 325 MG/1; MG/1
1 TABLET ORAL
Status: COMPLETED | OUTPATIENT
Start: 2024-12-08 | End: 2024-12-08

## 2024-12-08 RX ADMIN — CYCLOBENZAPRINE 10 MG: 10 TABLET, FILM COATED ORAL at 06:12

## 2024-12-08 RX ADMIN — HYDROCODONE BITARTRATE AND ACETAMINOPHEN 1 TABLET: 10; 325 TABLET ORAL at 06:12
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
## 2025-04-26 ENCOUNTER — HOSPITAL ENCOUNTER (EMERGENCY)
Facility: HOSPITAL | Age: 45
Discharge: HOME | End: 2025-04-26
Attending: EMERGENCY MEDICINE
Payer: MEDICAID

## 2025-04-26 VITALS
WEIGHT: 170 LBS | SYSTOLIC BLOOD PRESSURE: 140 MMHG | RESPIRATION RATE: 22 BRPM | DIASTOLIC BLOOD PRESSURE: 61 MMHG | HEIGHT: 62 IN | BODY MASS INDEX: 31.28 KG/M2 | OXYGEN SATURATION: 99 % | TEMPERATURE: 97 F | HEART RATE: 70 BPM

## 2025-04-26 DIAGNOSIS — S92.335A CLOSED NONDISPLACED FRACTURE OF THIRD METATARSAL BONE OF LEFT FOOT, INITIAL ENCOUNTER: ICD-10-CM
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
## 2025-04-26 DIAGNOSIS — S92.325A CLOSED NONDISPLACED FRACTURE OF SECOND METATARSAL BONE OF LEFT FOOT, INITIAL ENCOUNTER: ICD-10-CM

## 2025-04-26 PROCEDURE — 96372 THER/PROPH/DIAG INJ SC/IM: CPT | Performed by: EMERGENCY MEDICINE

## 2025-04-26 PROCEDURE — 99285 EMERGENCY DEPT VISIT HI MDM: CPT | Mod: 25

## 2025-04-26 PROCEDURE — 29515 APPLICATION SHORT LEG SPLINT: CPT | Mod: LT

## 2025-04-26 PROCEDURE — 63600175 PHARM REV CODE 636 W HCPCS: Mod: JZ,TB | Performed by: EMERGENCY MEDICINE

## 2025-04-26 RX ORDER — OXYCODONE AND ACETAMINOPHEN 10; 325 MG/1; MG/1
1 TABLET ORAL EVERY 6 HOURS PRN
Qty: 12 TABLET | Refills: 0 | Status: SHIPPED | OUTPATIENT
Start: 2025-04-26

## 2025-04-26 RX ORDER — HYDROMORPHONE HYDROCHLORIDE 2 MG/ML
1 INJECTION, SOLUTION INTRAMUSCULAR; INTRAVENOUS; SUBCUTANEOUS
Refills: 0 | Status: COMPLETED | OUTPATIENT
Start: 2025-04-26 | End: 2025-04-26

## 2025-04-26 RX ORDER — KETOROLAC TROMETHAMINE 30 MG/ML
60 INJECTION, SOLUTION INTRAMUSCULAR; INTRAVENOUS
Status: DISCONTINUED | OUTPATIENT
Start: 2025-04-26 | End: 2025-04-27 | Stop reason: HOSPADM

## 2025-04-26 RX ORDER — NAPROXEN 500 MG/1
500 TABLET ORAL 2 TIMES DAILY WITH MEALS
Qty: 60 TABLET | Refills: 0 | Status: SHIPPED | OUTPATIENT
Start: 2025-04-26

## 2025-04-26 RX ADMIN — HYDROMORPHONE HYDROCHLORIDE 1 MG: 2 INJECTION, SOLUTION INTRAMUSCULAR; INTRAVENOUS; SUBCUTANEOUS at 10:04

## 2025-04-27 NOTE — ED PROVIDER NOTES
Encounter Date: 4/26/2025       History     Chief Complaint   Patient presents with    Foot Injury     Fell Thursday.  C/O left ankle/foot pain     44-year-old female history of chronic back pain after back surgery, hypertension status post slip and fall 2 days ago.  Patient now has left ankle and foot pain.  Patient has been walking on it however the pain has increased as well as the swelling and hydrocodone at home are not working.  Patient denies any other injuries.  No head trauma, loss of consciousness, syncope    The history is provided by the patient.     Review of patient's allergies indicates:  No Known Allergies  Past Medical History:   Diagnosis Date    Anxiety disorder, unspecified     Hypertension     Unspecified open wound of lower back and pelvis without penetration into retroperitoneum, initial encounter      Past Surgical History:   Procedure Laterality Date    BACK SURGERY      CHOLECYSTECTOMY      HYSTERECTOMY      TUBAL LIGATION       No family history on file.  Social History[1]  Review of Systems   Constitutional:  Negative for chills, diaphoresis, fatigue and fever.   HENT:  Negative for congestion, drooling, ear discharge, ear pain, postnasal drip, rhinorrhea, sinus pressure, sinus pain, sore throat and tinnitus.    Eyes:  Negative for discharge.   Respiratory:  Negative for cough, chest tightness, shortness of breath and wheezing.    Cardiovascular:  Negative for chest pain and palpitations.   Gastrointestinal:  Negative for abdominal pain, diarrhea, nausea and vomiting.   Genitourinary:  Negative for frequency, hematuria and urgency.   Musculoskeletal:  Negative for back pain, neck pain and neck stiffness.   Skin:  Negative for rash.   Neurological:  Negative for syncope, weakness, light-headedness, numbness and headaches.   Psychiatric/Behavioral:  Negative for suicidal ideas.        Physical Exam     Initial Vitals [04/26/25 2118]   BP Pulse Resp Temp SpO2   (!) 134/102 105 20 97.7 °F  (36.5 °C) 98 %      MAP       --         Physical Exam    Nursing note and vitals reviewed.  Constitutional: No distress.   HENT:   Head: Atraumatic.   Neck:   Normal range of motion.  Cardiovascular:  Normal rate.           Pulmonary/Chest: No respiratory distress.   Musculoskeletal:      Cervical back: Normal range of motion.      Left lower leg: No bony tenderness.      Left ankle: No swelling or deformity. Decreased range of motion.      Left Achilles Tendon: No tenderness.      Left foot: Decreased range of motion. Swelling and bony tenderness present. Normal pulse.        Feet:      Neurological: She is alert and oriented to person, place, and time.   Skin: Skin is warm. No rash noted. No erythema.         ED Course   Procedures  Labs Reviewed - No data to display       Imaging Results              CT Foot Without Contrast Left (In process)  Result time 04/26/25 22:50:43                     X-Ray Ankle Complete Left (Final result)  Result time 04/26/25 22:40:42      Final result by Rashid Henley MD (04/26/25 22:40:42)                   Impression:      No acute osseous abnormality identified.      Electronically signed by: Rashid Henley  Date:    04/26/2025  Time:    22:40               Narrative:    EXAMINATION:  Left ankle three views.    CLINICAL HISTORY:  For.    TECHNIQUE:  Three views.    COMPARISON:  None available .    FINDINGS:  Articular surfaces are unremarkable and there is no intrinsic osseous abnormality.  There is no acute fracture, dislocation or arthritic change.  Calcaneal enthesophyte.                                       X-Ray Foot Complete Left (In process)                      Medications   ketorolac injection 60 mg (60 mg Intramuscular Not Given 4/26/25 2214)   HYDROmorphone (PF) injection 1 mg (1 mg Intramuscular Given 4/26/25 2233)     Medical Decision Making  Medical Decision Making  Problem list/ differential diagnosis including but not limited to:  Ankle fracture, ankle sprain,  foot fracture, foot sprain      Patient's chronic illnesses impacting care:  none    Diagnostic test considered but not ordered:    My interpretations:  Foot x-ray: 2nd metatarsal fracture  Foot CT:  2nd and 3rd metatarsal fractures    Radiology reports      Discussion of case with external qualified healthcare professionals:  Dr. Torres with ortho.  Get CT.  Splint.  Nonweightbearing.  Follow up in her office next week    Review of external notes( inpt, ems, NH, clinic):      Decision rules/scores:    Medications reviewed:  Hydrocodone  Medications ordered in the ER:  Dilaudid, splint  Discharge prescriptions:  Percocet    Social variables possible impacting patient's healthcare:    Code status/discussion    Shared decision making:    Consideration for admission versus discharge: stable for discharge     Amount and/or Complexity of Data Reviewed  Radiology: ordered.    Risk  Prescription drug management.                                      Clinical Impression:  Final diagnoses:  [W19.XXXA] Fall (Primary)  [S92.325A] Closed nondisplaced fracture of second metatarsal bone of left foot, initial encounter  [S92.335A] Closed nondisplaced fracture of third metatarsal bone of left foot, initial encounter          ED Disposition Condition    Discharge Good          ED Prescriptions       Medication Sig Dispense Start Date End Date Auth. Provider    naproxen (NAPROSYN) 500 MG tablet Take 1 tablet (500 mg total) by mouth 2 (two) times daily with meals. 60 tablet 4/26/2025 -- Rik Hudson MD    oxyCODONE-acetaminophen (PERCOCET)  mg per tablet Take 1 tablet by mouth every 6 (six) hours as needed for Pain. 12 tablet 4/26/2025 -- Rik Hudson MD          Follow-up Information       Follow up With Specialties Details Why Contact Info    Jarred Torres MD Orthopedic Surgery In 1 week  [facility] [facility] [place] [address] [phone]                 [1]   Social History  Tobacco Use    Smoking  status: Former     Types: Vaping with nicotine    Smokeless tobacco: Never   Substance Use Topics    Alcohol use: Yes     Comment: drinks socially    Drug use: Never        Rik Hudson MD  04/26/25 5732

## 2025-05-01 ENCOUNTER — OFFICE VISIT (OUTPATIENT)
Dept: ORTHOPEDICS | Facility: CLINIC | Age: 45
End: 2025-05-01
Payer: MEDICAID

## 2025-05-01 VITALS
HEART RATE: 100 BPM | DIASTOLIC BLOOD PRESSURE: 62 MMHG | SYSTOLIC BLOOD PRESSURE: 96 MMHG | HEIGHT: 62 IN | WEIGHT: 170 LBS | BODY MASS INDEX: 31.28 KG/M2

## 2025-05-01 DIAGNOSIS — S92.325A CLOSED NONDISPLACED FRACTURE OF SECOND METATARSAL BONE OF LEFT FOOT, INITIAL ENCOUNTER: Primary | ICD-10-CM

## 2025-05-01 DIAGNOSIS — S92.302A CLOSED NONDISPLACED FRACTURE OF METATARSAL BONE OF LEFT FOOT, UNSPECIFIED METATARSAL, INITIAL ENCOUNTER: ICD-10-CM

## 2025-05-01 RX ORDER — AMITRIPTYLINE HYDROCHLORIDE 100 MG/1
100 TABLET ORAL NIGHTLY
COMMUNITY

## 2025-05-01 NOTE — PROGRESS NOTES
Subjective:       Patient ID: Odette Patel is a 44 y.o. female.    Chief Complaint   Patient presents with    Left Foot - Injury     DOI; 4/24/25,  left 2nd, 3rd MT FX, ER 4/26/25, nwb, ambulates with crutches, reports constant pain, initial injury is from a slip and fall, presents in splint today,         Patient is here today for initial evaluation injury sustained to her left foot and trip and fall in her driveway.  She states she was moving her trash cans to the road and it had rained and she slipped and fell.  She sustained an injury to her left foot, she thought it was the sprain however the pain did not improve and she presented to the emergency department a couple of days later.  She was found to have nondisplaced fracture of the base of the 2nd and base of the 3rd metatarsals with no widening of her inter cuneiform or tarsometatarsal joints.  She was placed into a splint and referred to me for management.  She has a history of prior back fusion in her lumbar spine and her cervical spine.  Her surgery is over 2 years out and she has been in chronic pain management with her spine surgeon since that time.  She was provided prescriptions from the emergency department for narcotic pain medication.  She also has prescriptions for anti-inflammatory medications from her previous treating spine surgeon pending at her pharmacy.  She states she has been nonweightbearing to the left foot with crutches.    Injury  Pertinent negatives include no abdominal pain, chest pain, chills, congestion, coughing, fever, nausea, neck pain, numbness or vomiting.       Review of Systems   Constitutional: Negative for chills, fever and malaise/fatigue.   HENT:  Negative for congestion and hearing loss.    Eyes:  Negative for visual disturbance.   Cardiovascular:  Negative for chest pain and syncope.   Respiratory:  Negative for cough and shortness of breath.    Hematologic/Lymphatic: Does not bruise/bleed easily.   Skin:   "Negative for color change and suspicious lesions.   Musculoskeletal:  Negative for falls and neck pain.   Gastrointestinal:  Negative for abdominal pain, nausea and vomiting.   Genitourinary:  Negative for dysuria and hematuria.   Neurological:  Negative for numbness and sensory change.   Psychiatric/Behavioral:  Negative for altered mental status. The patient is not nervous/anxious.         Medications Ordered Prior to Encounter[1]       Objective:      BP 96/62   Pulse 100   Ht 5' 2" (1.575 m)   Wt 77.1 kg (169 lb 15.6 oz)   BMI 31.09 kg/m²   Physical Exam  Constitutional:       General: She is not in acute distress.     Appearance: Normal appearance. She is not ill-appearing.   HENT:      Head: Normocephalic and atraumatic.      Nose: No congestion.   Eyes:      Extraocular Movements: Extraocular movements intact.   Cardiovascular:      Rate and Rhythm: Normal rate and regular rhythm.      Pulses: Normal pulses.   Pulmonary:      Effort: Pulmonary effort is normal.      Breath sounds: Normal breath sounds.   Abdominal:      General: There is no distension.      Palpations: Abdomen is soft.      Tenderness: There is no abdominal tenderness.   Musculoskeletal:      Comments: Left lower extremity: Palpable DP pulse.  Pain and tenderness appear out of proportion to the examination an injury sustained.  She is exquisitely tender to palpation over the anterior lateral aspect of her ankle.  No effusion is noted in the ankle.  No midfoot swelling noted today her sensation light touch is intact.  She has a 2+ DP pulse.  No calf swelling or tenderness, no signs of DVT.   Skin:     General: Skin is warm and dry.   Neurological:      Mental Status: She is alert and oriented to person, place, and time. Mental status is at baseline.   Psychiatric:         Mood and Affect: Mood normal.         Behavior: Behavior normal.         Thought Content: Thought content normal.         Judgment: Judgment normal.        Body mass " index is 31.09 kg/m².    Radiology:   X-ray and CT evaluations from the emergency department reveal nondisplaced fractures of the base of the 2nd and base of the 3rd metatarsals.  No subluxations of her joint, no widening of the Lisfranc joint.      Assessment:         1. Closed nondisplaced fracture of second metatarsal bone of left foot, initial encounter  CANCELED: X-Ray Foot Complete Left              Plan:       An extensive discussion was held with the patient today regarding our findings.  No operative intervention is necessary.  Her fracture is stable she does not appear to have any concomitant ligamentous injuries of the midfoot.  We will place her into a short-leg cast today she will need to maintain her nonweightbearing status.  Pain management is going to be an issue for the patient going forward due to her history of chronic narcotic use from her previous surgeries.  I have instructed the patient to fill this the prescriptions provided to her by the emergency department for the narcotics because she has stated that her previous treating physician is no longer going to provide her with narcotics now that she has a new injury.  She can utilize the prescription anti-inflammatories that were provided by the her spine surgeon as well.  No new scripts are provided to her today.  Once these prescriptions are finished she will call for refills from our office.  She understands that she will be wean down and off of all narcotics over the course of 8 weeks as she continues to heal and then we will not be assuming care of her chronic neck and back pain management issues.  All questions and concerns were addressed today and they understand and agree with our plan.    This note/OR report was created with the assistance of  voice recognition software or phone  dictation.  There may be transcription errors as a result of using this technology however minimal. Effort has been made to assure accuracy of transcription  but any obvious errors or omissions should be clarified with the author of the document.       Jarred Torres MD  Orthopedic Trauma  [facility]      Follow up in about 3 weeks (around 5/22/2025).    Closed nondisplaced fracture of second metatarsal bone of left foot, initial encounter  -     Cancel: X-Ray Foot Complete Left; Future; Expected date: 05/01/2025              No orders of the defined types were placed in this encounter.      Future Appointments   Date Time Provider Department Center   5/29/2025 10:00 AM [facility]                      [1]   Current Outpatient Medications on File Prior to Visit   Medication Sig Dispense Refill    ALPRAZolam (XANAX) 1 MG tablet Take 1 tablet (1 mg total) by mouth 2 (two) times daily. 30 tablet 0    amitriptyline (ELAVIL) 100 MG tablet Take 100 mg by mouth every evening.      ketorolac (TORADOL) 10 mg tablet Take 1 tablet (10 mg total) by mouth every 8 (eight) hours as needed for Pain. 10 tablet 0    naproxen (NAPROSYN) 500 MG tablet Take 1 tablet (500 mg total) by mouth 2 (two) times daily with meals. 60 tablet 0    naproxen (NAPROSYN) 500 MG tablet Take 1 tablet (500 mg total) by mouth 2 (two) times daily with meals. 60 tablet 0    ondansetron (ZOFRAN-ODT) 4 MG TbDL Take 1 tablet (4 mg total) by mouth every 8 (eight) hours as needed (Nausea/Vomiting). 12 tablet 0    oxyCODONE-acetaminophen (PERCOCET)  mg per tablet Take 1 tablet by mouth every 6 (six) hours as needed for Pain. 12 tablet 0     No current facility-administered medications on file prior to visit.

## 2025-06-29 ENCOUNTER — HOSPITAL ENCOUNTER (EMERGENCY)
Facility: HOSPITAL | Age: 45
Discharge: HOME | End: 2025-06-30
Attending: EMERGENCY MEDICINE
Payer: MEDICAID

## 2025-06-29 DIAGNOSIS — R51.9 NONINTRACTABLE HEADACHE, UNSPECIFIED CHRONICITY PATTERN, UNSPECIFIED HEADACHE TYPE: Primary | ICD-10-CM

## 2025-06-29 DIAGNOSIS — R42 DIZZINESS: ICD-10-CM

## 2025-06-29 LAB
ALBUMIN SERPL-MCNC: 3.3 G/DL (ref 3.5–5)
ALBUMIN/GLOB SERPL: 0.9 RATIO (ref 1.1–2)
ALP SERPL-CCNC: 79 UNIT/L (ref 40–150)
ALT SERPL-CCNC: 23 UNIT/L (ref 0–55)
ANION GAP SERPL CALC-SCNC: 10 MEQ/L
AST SERPL-CCNC: 17 UNIT/L (ref 11–45)
BASOPHILS # BLD AUTO: 0.02 X10(3)/MCL
BASOPHILS NFR BLD AUTO: 0.3 %
BILIRUB SERPL-MCNC: 0.2 MG/DL
BNP BLD-MCNC: <10 PG/ML
BUN SERPL-MCNC: 4.8 MG/DL (ref 7–18.7)
CALCIUM SERPL-MCNC: 8.3 MG/DL (ref 8.4–10.2)
CHLORIDE SERPL-SCNC: 102 MMOL/L (ref 98–107)
CO2 SERPL-SCNC: 29 MMOL/L (ref 22–29)
CREAT SERPL-MCNC: 0.89 MG/DL (ref 0.55–1.02)
CREAT/UREA NIT SERPL: 5
EOSINOPHIL # BLD AUTO: 0.3 X10(3)/MCL (ref 0–0.9)
EOSINOPHIL NFR BLD AUTO: 4.1 %
ERYTHROCYTE [DISTWIDTH] IN BLOOD BY AUTOMATED COUNT: 13 % (ref 11.5–17)
ETHANOL SERPL-MCNC: <10 MG/DL
GFR SERPLBLD CREATININE-BSD FMLA CKD-EPI: >60 ML/MIN/1.73/M2
GLOBULIN SER-MCNC: 3.8 GM/DL (ref 2.4–3.5)
GLUCOSE SERPL-MCNC: 125 MG/DL (ref 74–100)
HCT VFR BLD AUTO: 39.2 % (ref 37–47)
HGB BLD-MCNC: 12.6 G/DL (ref 12–16)
IMM GRANULOCYTES # BLD AUTO: 0.02 X10(3)/MCL (ref 0–0.04)
IMM GRANULOCYTES NFR BLD AUTO: 0.3 %
LYMPHOCYTES # BLD AUTO: 3.16 X10(3)/MCL (ref 0.6–4.6)
LYMPHOCYTES NFR BLD AUTO: 42.8 %
MAGNESIUM SERPL-MCNC: 1.5 MG/DL (ref 1.6–2.6)
MCH RBC QN AUTO: 27.3 PG (ref 27–31)
MCHC RBC AUTO-ENTMCNC: 32.1 G/DL (ref 33–36)
MCV RBC AUTO: 85 FL (ref 80–94)
MONOCYTES # BLD AUTO: 0.52 X10(3)/MCL (ref 0.1–1.3)
MONOCYTES NFR BLD AUTO: 7 %
NEUTROPHILS # BLD AUTO: 3.36 X10(3)/MCL (ref 2.1–9.2)
NEUTROPHILS NFR BLD AUTO: 45.5 %
NRBC BLD AUTO-RTO: 0 %
PLATELET # BLD AUTO: 198 X10(3)/MCL (ref 130–400)
PMV BLD AUTO: 9.3 FL (ref 7.4–10.4)
POTASSIUM SERPL-SCNC: 3 MMOL/L (ref 3.5–5.1)
PROT SERPL-MCNC: 7.1 GM/DL (ref 6.4–8.3)
RBC # BLD AUTO: 4.61 X10(6)/MCL (ref 4.2–5.4)
SODIUM SERPL-SCNC: 141 MMOL/L (ref 136–145)
TROPONIN I SERPL-MCNC: <0.01 NG/ML (ref 0–0.04)
WBC # BLD AUTO: 7.38 X10(3)/MCL (ref 4.5–11.5)

## 2025-06-29 PROCEDURE — 25000003 PHARM REV CODE 250: Performed by: EMERGENCY MEDICINE

## 2025-06-29 PROCEDURE — 82077 ASSAY SPEC XCP UR&BREATH IA: CPT | Performed by: EMERGENCY MEDICINE

## 2025-06-29 PROCEDURE — 83880 ASSAY OF NATRIURETIC PEPTIDE: CPT | Performed by: EMERGENCY MEDICINE

## 2025-06-29 PROCEDURE — 80053 COMPREHEN METABOLIC PANEL: CPT | Performed by: EMERGENCY MEDICINE

## 2025-06-29 PROCEDURE — 83735 ASSAY OF MAGNESIUM: CPT | Performed by: EMERGENCY MEDICINE

## 2025-06-29 PROCEDURE — 99285 EMERGENCY DEPT VISIT HI MDM: CPT | Mod: 25

## 2025-06-29 PROCEDURE — 85025 COMPLETE CBC W/AUTO DIFF WBC: CPT | Performed by: EMERGENCY MEDICINE

## 2025-06-29 PROCEDURE — 63600175 PHARM REV CODE 636 W HCPCS: Performed by: EMERGENCY MEDICINE

## 2025-06-29 PROCEDURE — 93010 ELECTROCARDIOGRAM REPORT: CPT | Mod: ,,, | Performed by: INTERNAL MEDICINE

## 2025-06-29 PROCEDURE — 96375 TX/PRO/DX INJ NEW DRUG ADDON: CPT

## 2025-06-29 PROCEDURE — 82962 GLUCOSE BLOOD TEST: CPT

## 2025-06-29 PROCEDURE — 84484 ASSAY OF TROPONIN QUANT: CPT | Performed by: EMERGENCY MEDICINE

## 2025-06-29 PROCEDURE — 93005 ELECTROCARDIOGRAM TRACING: CPT

## 2025-06-29 RX ORDER — MAGNESIUM SULFATE HEPTAHYDRATE 40 MG/ML
2 INJECTION, SOLUTION INTRAVENOUS ONCE
Status: COMPLETED | OUTPATIENT
Start: 2025-06-30 | End: 2025-06-30

## 2025-06-29 RX ORDER — MECLIZINE HYDROCHLORIDE 25 MG/1
25 TABLET ORAL
Status: DISCONTINUED | OUTPATIENT
Start: 2025-06-29 | End: 2025-06-29

## 2025-06-29 RX ORDER — POTASSIUM CHLORIDE 20 MEQ/1
40 TABLET, EXTENDED RELEASE ORAL
Status: COMPLETED | OUTPATIENT
Start: 2025-06-30 | End: 2025-06-30

## 2025-06-29 RX ORDER — DIPHENHYDRAMINE HYDROCHLORIDE 50 MG/ML
25 INJECTION, SOLUTION INTRAMUSCULAR; INTRAVENOUS
Status: COMPLETED | OUTPATIENT
Start: 2025-06-29 | End: 2025-06-29

## 2025-06-29 RX ORDER — METOCLOPRAMIDE HYDROCHLORIDE 5 MG/ML
10 INJECTION INTRAMUSCULAR; INTRAVENOUS
Status: COMPLETED | OUTPATIENT
Start: 2025-06-29 | End: 2025-06-29

## 2025-06-29 RX ORDER — KETOROLAC TROMETHAMINE 30 MG/ML
15 INJECTION, SOLUTION INTRAMUSCULAR; INTRAVENOUS
Status: COMPLETED | OUTPATIENT
Start: 2025-06-30 | End: 2025-06-29

## 2025-06-29 RX ADMIN — METOCLOPRAMIDE 10 MG: 5 INJECTION, SOLUTION INTRAMUSCULAR; INTRAVENOUS at 11:06

## 2025-06-29 RX ADMIN — KETOROLAC TROMETHAMINE 15 MG: 30 INJECTION, SOLUTION INTRAMUSCULAR; INTRAVENOUS at 11:06

## 2025-06-29 RX ADMIN — SODIUM CHLORIDE 1000 ML: 9 INJECTION, SOLUTION INTRAVENOUS at 11:06

## 2025-06-29 RX ADMIN — DIPHENHYDRAMINE HYDROCHLORIDE 25 MG: 50 INJECTION INTRAMUSCULAR; INTRAVENOUS at 11:06

## 2025-06-30 VITALS
SYSTOLIC BLOOD PRESSURE: 127 MMHG | DIASTOLIC BLOOD PRESSURE: 64 MMHG | HEART RATE: 77 BPM | TEMPERATURE: 98 F | RESPIRATION RATE: 20 BRPM | WEIGHT: 186 LBS | OXYGEN SATURATION: 100 % | BODY MASS INDEX: 34.23 KG/M2 | HEIGHT: 62 IN

## 2025-06-30 LAB
OHS QRS DURATION: 84 MS
OHS QTC CALCULATION: 457 MS

## 2025-06-30 PROCEDURE — 96365 THER/PROPH/DIAG IV INF INIT: CPT

## 2025-06-30 PROCEDURE — 25000003 PHARM REV CODE 250: Performed by: EMERGENCY MEDICINE

## 2025-06-30 PROCEDURE — 63600175 PHARM REV CODE 636 W HCPCS: Mod: JZ,TB | Performed by: EMERGENCY MEDICINE

## 2025-06-30 PROCEDURE — 96361 HYDRATE IV INFUSION ADD-ON: CPT

## 2025-06-30 PROCEDURE — 25500020 PHARM REV CODE 255: Performed by: EMERGENCY MEDICINE

## 2025-06-30 RX ORDER — BUTALBITAL, ACETAMINOPHEN AND CAFFEINE 50; 325; 40 MG/1; MG/1; MG/1
1 TABLET ORAL EVERY 4 HOURS PRN
Qty: 30 TABLET | Refills: 0 | Status: SHIPPED | OUTPATIENT
Start: 2025-06-30

## 2025-06-30 RX ORDER — MECLIZINE HYDROCHLORIDE 25 MG/1
25 TABLET ORAL 3 TIMES DAILY PRN
Qty: 20 TABLET | Refills: 0 | Status: SHIPPED | OUTPATIENT
Start: 2025-06-30

## 2025-06-30 RX ORDER — MECLIZINE HYDROCHLORIDE 25 MG/1
25 TABLET ORAL
Status: COMPLETED | OUTPATIENT
Start: 2025-06-30 | End: 2025-06-30

## 2025-06-30 RX ADMIN — POTASSIUM CHLORIDE 40 MEQ: 1500 TABLET, EXTENDED RELEASE ORAL at 12:06

## 2025-06-30 RX ADMIN — MAGNESIUM SULFATE HEPTAHYDRATE 2 G: 40 INJECTION, SOLUTION INTRAVENOUS at 12:06

## 2025-06-30 RX ADMIN — IOHEXOL 92 ML: 350 INJECTION, SOLUTION INTRAVENOUS at 12:06

## 2025-06-30 RX ADMIN — MECLIZINE HYDROCHLORIDE 25 MG: 25 TABLET ORAL at 12:06

## 2025-06-30 NOTE — ED PROVIDER NOTES
"ED PROVIDER NOTE  6/29/2025    CHIEF COMPLAINT:   Chief Complaint   Patient presents with    Dizziness     Pt here with c/o dizziness, HA, blurry vision x1.5 hrs. VAN-. Pt states she thought her BP was the problem so she took her metoprolol pta. No drift, no unilateral deficits, no droop noted. A&Ox4.        HISTORY OF PRESENT ILLNESS:   Odette Patel is a 44 y.o. female who presents with chief complaint Dizziness.  Onset was about an hour and a half ago whenever she began having dizziness that she describes as "feels like I am in an earthquake" that she states is constant.  Reports about 4:00 p.m. today she began having headache that has been constant.  She states she thought it was her blood pressure being high so she took her blood pressure medication but states this did not help.  Reports having associated nausea and blurred vision.  Denies numbness or weakness in extremities, trouble with speech or swallowing.    The history is provided by the patient.         REVIEW OF SYSTEMS: as noted in the HPI.  NURSING NOTES REVIEWED      PAST MEDICAL/SURGICAL HISTORY:   Past Medical History:   Diagnosis Date    Anxiety disorder, unspecified     Hypertension     Unspecified open wound of lower back and pelvis without penetration into retroperitoneum, initial encounter       Past Surgical History:   Procedure Laterality Date    BACK SURGERY      CHOLECYSTECTOMY      HYSTERECTOMY      TUBAL LIGATION         FAMILY HISTORY: No family history on file.    SOCIAL HISTORY: Social History[1]    ALLERGIES: Review of patient's allergies indicates:  No Known Allergies    PHYSICAL EXAM:  Initial Vitals [06/29/25 2333]   BP Pulse Resp Temp SpO2   (!) 130/94 87 20 97.9 °F (36.6 °C) 99 %      MAP       --         Physical Exam    Nursing note and vitals reviewed.  Constitutional: She appears well-developed and well-nourished.   HENT:   Head: Normocephalic and atraumatic. Mouth/Throat: Uvula is midline and mucous membranes are " normal.   Eyes: EOM are normal. Pupils are equal, round, and reactive to light.   Neck: Trachea normal. Neck supple.   Cardiovascular:  Normal rate, regular rhythm and normal pulses.           Pulmonary/Chest: Effort normal and breath sounds normal.   Abdominal: Abdomen is soft. Bowel sounds are normal. There is no rebound and no guarding.   Musculoskeletal:         General: Normal range of motion.      Cervical back: Neck supple.     Neurological: She is alert and oriented to person, place, and time. She has normal strength. No cranial nerve deficit or sensory deficit. GCS eye subscore is 4. GCS verbal subscore is 5. GCS motor subscore is 6.   Normal finger-nose-finger and heel-shin test.   Skin: Skin is warm and dry.   Psychiatric: She has a normal mood and affect. Her speech is normal. Thought content normal.         RESULTS:  Labs Reviewed   COMPREHENSIVE METABOLIC PANEL - Abnormal       Result Value    Sodium 141      Potassium 3.0 (*)     Chloride 102      CO2 29      Glucose 125 (*)     Blood Urea Nitrogen 4.8 (*)     Creatinine 0.89      Calcium 8.3 (*)     Protein Total 7.1      Albumin 3.3 (*)     Globulin 3.8 (*)     Albumin/Globulin Ratio 0.9 (*)     Bilirubin Total 0.2      ALP 79      ALT 23      AST 17      eGFR >60      Anion Gap 10.0      BUN/Creatinine Ratio 5     MAGNESIUM - Abnormal    Magnesium Level 1.50 (*)    CBC WITH DIFFERENTIAL - Abnormal    WBC 7.38      RBC 4.61      Hgb 12.6      Hct 39.2      MCV 85.0      MCH 27.3      MCHC 32.1 (*)     RDW 13.0      Platelet 198      MPV 9.3      Neut % 45.5      Lymph % 42.8      Mono % 7.0      Eos % 4.1      Basophil % 0.3      Imm Grans % 0.3      Neut # 3.36      Lymph # 3.16      Mono # 0.52      Eos # 0.30      Baso # 0.02      Imm Gran # 0.02      NRBC% 0.0     B-TYPE NATRIURETIC PEPTIDE - Normal    Natriuretic Peptide <10.0     TROPONIN I - Normal    Troponin-I <0.010     ALCOHOL,MEDICAL (ETHANOL) - Normal    Ethanol Level <10.0     CBC W/  AUTO DIFFERENTIAL    Narrative:     The following orders were created for panel order CBC auto differential.  Procedure                               Abnormality         Status                     ---------                               -----------         ------                     CBC with Differential[[id number]]       Abnormal            Final result                 Please view results for these tests on the individual orders.   DRUG SCREEN, URINE (BEAKER)   URINALYSIS, REFLEX TO URINE CULTURE     Imaging Results              CTA Head and Neck (xpd) (Preliminary result)  Result time 06/30/25 00:43:51      Preliminary result by Rehan Gibbons MD (06/30/25 00:43:51)                   Narrative:    START OF REPORT:  Technique: CT angiogram of the intracranial vessels was performed with intravenous contrast with direct axial as well as sagittal and coronal reformations.    Comparison: Comparison is with plain cranial study [id number] 23:26:50.    Clinical history: Pt here with c/o dizziness, HA, blurry vision x1.5 hrs. VAN-. Pt states she thought her BP was the problem so she took her metoprolol pta. No drift, no unilateral deficits, no droop noted. A&Ox4.    Findings:  Intracranial Vascular structures:  Internal carotid arteries: Unremarkable.  Middle cerebral arteries: Unremarkable.  Anterior cerebral arteries: Unremarkable.  Vertebral arteries: Unremarkable.  Basilar artery: Unremarkable.  Posterior cerebral arteries: Unremarkable.  Posterior communicating arteries: Unremarkable.  Carotids:  Common carotid arteries: Unremarkable.  Internal carotid artery: Unremarkable.  Vertebral arteries: Unremarkable.  Brain parenchyma: No abnormal intracranial enhancement is seen on the post contrast images.    Miscellaneous: Anterior spinal fusion device is seen at the level of C4-C6.      Impression:  1. Unremarkable CT angiogram of the head and neck. Details as above.                                         CT Head  Without Contrast (Preliminary result)  Result time 06/29/25 23:56:54      Preliminary result by Rehan Gibbons MD (06/29/25 23:56:54)                   Narrative:    START OF REPORT:  Technique: CT of the head was performed without intravenous contrast with axial as well as coronal and sagittal images.    Comparison: Comparison is with study dated 2024-12-06 15:23:37.    Dosage Information: Automated exposure control was utilized.    Clinical history: Dizziness, persistent/recurrent, cardiac or vascular cause suspected.    Findings:  Hemorrhage: No acute intracranial hemorrhage is seen.  CSF spaces: The ventricles sulci and basal cisterns are within normal limits and stable compared to the prior study.  Brain parenchyma: There is preservation of the grey white junction throughout. No acute infarct is identified.  Cerebellum: Unremarkable.  Sella and skull base: The sella appears to be within normal limits for age.  Intracranial calcifications: Incidental note is made of bilateral choroid plexus calcification. Incidental note is made of some pineal region calcification.  Calvarium: No acute linear or depressed skull fracture is seen.    Maxillofacial Structures:  Paranasal sinuses: The visualized paranasal sinuses appear clear with no significant mucoperiosteal thickening or air fluid levels identified.  Orbits: The orbits appear unremarkable.  Zygomatic arches: The zygomatic arches are intact and unremarkable.  Temporal bones and mastoids: The temporal bones and mastoids appear unremarkable.  TMJ: The mandibular condyles appear normally placed with respect to the mandibular fossa.      Impression:  1. No acute intracranial process identified. Details and other findings as noted above.                          Wet Read by Riley Morrell DO (06/29/25 23:40:25, [facility] - Emergency Dept, Emergency Medicine)    No extra-axial hemorrhage.                                     PROCEDURES:  Procedures    ECG:  EKG Readings: (Independently Interpreted)   Initial Reading: No STEMI. Rhythm: Normal Sinus Rhythm. Heart Rate: 96. Ectopy: No Ectopy. Conduction: Normal. Axis: Normal.       ED COURSE AND MEDICAL DECISION MAKING:  Medications   sodium chloride 0.9% bolus 1,000 mL 1,000 mL (0 mLs Intravenous Stopped 6/30/25 0040)   metoclopramide injection 10 mg (10 mg Intravenous Given 6/29/25 2340)   diphenhydrAMINE injection 25 mg (25 mg Intravenous Given 6/29/25 2340)   ketorolac injection 15 mg (15 mg Intravenous Given 6/29/25 2357)   magnesium sulfate 2g in water 50mL IVPB (premix) (0 g Intravenous Stopped 6/30/25 0125)   potassium chloride SA CR tablet 40 mEq (40 mEq Oral Given 6/30/25 0025)   iohexoL (OMNIPAQUE 350) injection 92 mL (92 mLs Intravenous Given 6/30/25 0017)   meclizine tablet 25 mg (25 mg Oral Given 6/30/25 0049)     ED Course as of 06/30/25 0217   Sun Jun 29, 2025 2336 WBC: 7.38 [IB]   2336 Hemoglobin: 12.6 [IB]   2354 Alcohol, Serum: <10.0 [IB]   2357 Magnesium (!): 1.50 [IB]   2357 Creatinine: 0.89 [IB]   2358 BNP: <10.0 [IB]   Mon Jun 30, 2025   0000 Troponin I: <0.010 [IB]   0121 Ambulating with steady gait now. [IB]      ED Course User Index  [IB] Riley Morrell,      Additional MDM:     NIH Stroke Scale:   Interval = baseline (upon arrival/admit)  Level of consciousness = 0 - alert  LOC questions = 0 - answers both correctly  LOC commands = 0 - performs both correctly  Best gaze = 0 - normal  Visual = 0 - no visual loss  Facial palsy = 0 - normal  Motor left arm =  0 - no drift  Motor right arm =  0 - no drift  Motor left leg = 0 - no drift  Motor right leg =  0 - no drift  Limb ataxia = 0 - absent  Sensory = 0 - normal  Best language = 0 - no aphasia  Dysarthria = 0 - normal articulation  Extinction and inattention = 0 - no neglect  NIH Stroke Scale Total = 0        Medical Decision Making  44-year-old female who presents with dizziness along with headache and  blurry vision.  Differential diagnosis includes SAH, CVA, vertebral artery dissection, among others.  NIHSS score 0.  CT head shows no acute intracranial process.  CBC unremarkable.  CMP shows hypokalemia at 3.0 with normal creatinine.  Magnesium 1.5.  She was given IV magnesium sulfate along with p.o. potassium chloride.  Troponin normal.  BNP normal.  ECG shows no evidence of STEMI.  CTA head and neck shows no acute abnormalities.  She reports feeling better after meclizine in his ambulating with a steady gait.  We will discharge with meclizine and Fioricet and instructed to follow up with her PCP.  She is agreeable with this plan.  Given strict ED return precautions. I have spoken with the patient and/or caregivers. I have explained the patient's condition, diagnoses and treatment plan based on the information available to me at this time. I have answered the patient's and/or caregiver's questions and addressed any concerns. The patient and/or caregivers have as good an understanding of the patient's diagnosis, condition and treatment plan as can be expected at this point. The vital signs have been stable. The patient's condition is stable and appropriate for discharge from the emergency department.     The patient will pursue further outpatient evaluation with the primary care physician or other designated or consulting physician as outlined in the discharge instructions. The patient and/or caregivers are agreeable to this plan of care and follow-up instructions have been explained in detail. The patient and/or caregivers have received these instructions in written format and have expressed an understanding of the discharge instructions. The patient and/or caregivers are aware that any significant change in condition or worsening of symptoms should prompt an immediate return to this or the closest emergency department or a call to 911.    Amount and/or Complexity of Data Reviewed  Labs: ordered. Decision-making  details documented in ED Course.  Radiology: ordered and independent interpretation performed.  ECG/medicine tests: ordered and independent interpretation performed.    Risk  OTC drugs.  Prescription drug management.  Decision regarding hospitalization.        CLINICAL IMPRESSION:  1. Nonintractable headache, unspecified chronicity pattern, unspecified headache type    2. Dizziness        DISPOSITION:   ED Disposition Condition    Discharge Stable            ED Prescriptions       Medication Sig Dispense Start Date End Date Auth. Provider    butalbital-acetaminophen-caffeine -40 mg (FIORICET, ESGIC) -40 mg per tablet Take 1 tablet by mouth every 4 (four) hours as needed for Pain. 30 tablet 6/30/2025 -- Riley Morrell DO    meclizine (ANTIVERT) 25 mg tablet Take 1 tablet (25 mg total) by mouth 3 (three) times daily as needed for Dizziness. 20 tablet 6/30/2025 -- Riley Morrell DO          Follow-up Information       Follow up With Specialties Details Why Contact Info    [facility] - Emergency Dept Emergency Medicine  If symptoms worsen [id number] [place]  [facility] [id number] [phone]                 [1]   Social History  Tobacco Use    Smoking status: Former     Types: Vaping with nicotine    Smokeless tobacco: Never   Substance Use Topics    Alcohol use: Yes     Comment: drinks socially    Drug use: Never        Riley Morrell DO  06/30/25 [id number]

## 2025-07-11 LAB — POCT GLUCOSE: 114 MG/DL (ref 70–110)
